# Patient Record
Sex: FEMALE | Race: AMERICAN INDIAN OR ALASKA NATIVE | HISPANIC OR LATINO | Employment: FULL TIME | URBAN - NONMETROPOLITAN AREA
[De-identification: names, ages, dates, MRNs, and addresses within clinical notes are randomized per-mention and may not be internally consistent; named-entity substitution may affect disease eponyms.]

---

## 2021-09-08 ENCOUNTER — ALLIED HEALTH/NURSE VISIT (OUTPATIENT)
Dept: FAMILY MEDICINE | Facility: OTHER | Age: 31
End: 2021-09-08
Attending: FAMILY MEDICINE
Payer: COMMERCIAL

## 2021-09-08 DIAGNOSIS — Z20.822 EXPOSURE TO 2019 NOVEL CORONAVIRUS: Primary | ICD-10-CM

## 2021-09-08 PROCEDURE — C9803 HOPD COVID-19 SPEC COLLECT: HCPCS

## 2021-09-08 PROCEDURE — U0003 INFECTIOUS AGENT DETECTION BY NUCLEIC ACID (DNA OR RNA); SEVERE ACUTE RESPIRATORY SYNDROME CORONAVIRUS 2 (SARS-COV-2) (CORONAVIRUS DISEASE [COVID-19]), AMPLIFIED PROBE TECHNIQUE, MAKING USE OF HIGH THROUGHPUT TECHNOLOGIES AS DESCRIBED BY CMS-2020-01-R: HCPCS | Mod: ZL

## 2021-09-09 LAB — SARS-COV-2 RNA RESP QL NAA+PROBE: NEGATIVE

## 2021-09-24 ENCOUNTER — ALLIED HEALTH/NURSE VISIT (OUTPATIENT)
Dept: OBGYN | Facility: OTHER | Age: 31
End: 2021-09-24
Attending: FAMILY MEDICINE
Payer: COMMERCIAL

## 2021-09-24 VITALS — HEIGHT: 63 IN | BODY MASS INDEX: 32.11 KG/M2 | WEIGHT: 181.2 LBS

## 2021-09-24 DIAGNOSIS — O36.80X0 ENCOUNTER TO DETERMINE FETAL VIABILITY OF PREGNANCY: ICD-10-CM

## 2021-09-24 DIAGNOSIS — Z32.01 PREGNANCY EXAMINATION OR TEST, POSITIVE RESULT: Primary | ICD-10-CM

## 2021-09-24 PROBLEM — M43.16 SPONDYLOLISTHESIS OF LUMBAR REGION: Status: ACTIVE | Noted: 2019-04-22

## 2021-09-24 PROBLEM — H92.02 LEFT EAR PAIN: Status: ACTIVE | Noted: 2020-10-05

## 2021-09-24 PROBLEM — E66.3 OVERWEIGHT (BMI 25.0-29.9): Status: ACTIVE | Noted: 2018-02-09

## 2021-09-24 PROBLEM — J06.9 VIRAL UPPER RESPIRATORY TRACT INFECTION: Status: ACTIVE | Noted: 2020-10-05

## 2021-09-24 PROBLEM — H16.202 KERATOCONJUNCTIVITIS OF LEFT EYE: Status: ACTIVE | Noted: 2017-12-01

## 2021-09-24 PROCEDURE — 99211 OFF/OP EST MAY X REQ PHY/QHP: CPT

## 2021-09-24 RX ORDER — PRENATAL WITH FERROUS FUM AND FOLIC ACID 3080; 920; 120; 400; 22; 1.84; 3; 20; 10; 1; 12; 200; 27; 25; 2 [IU]/1; [IU]/1; MG/1; [IU]/1; MG/1; MG/1; MG/1; MG/1; MG/1; MG/1; UG/1; MG/1; MG/1; MG/1; MG/1
1 TABLET ORAL DAILY
Qty: 90 TABLET | Refills: 3 | Status: SHIPPED | OUTPATIENT
Start: 2021-09-24

## 2021-09-24 RX ORDER — MINOXIDIL 5 %
FOAM (GRAM) TOPICAL
COMMUNITY
Start: 2021-04-27

## 2021-09-24 ASSESSMENT — PATIENT HEALTH QUESTIONNAIRE - PHQ9: SUM OF ALL RESPONSES TO PHQ QUESTIONS 1-9: 11

## 2021-09-24 ASSESSMENT — MIFFLIN-ST. JEOR: SCORE: 1506.05

## 2021-09-24 NOTE — PROGRESS NOTES
HPI:    This is a 31 year old female patient,  who presents today for OB Intake visit. Patient reports positive pregnancy test at home.     Obstetrical history and OB Questionnaire updated to the best of this nurse's ability based on patient report. PHQ-9 depression screening and routine Domestic Abuse screening completed. All immediate questions and concerns answered.    FOOD SECURITY SCREENING QUESTIONS  Hunger Vital Signs:  Within the past 12 months we worried whether our food would run out before we got money to buy more. Never  Within the past 12 months the food we bought just didn't last and we didn't have money to get more. Never    Last menstrual period is reported as Patient's last menstrual period was 2021. FRANCIA based on LMP is Estimated Date of Delivery: 2022.  Her cycles are regular.  Her last menstrual period was normal.   Since her LMP, she has experienced  nausea, abdominal pain, urinary frequency and hemorrhoids.       OBSTETRIC HISTORY:    OB History    Para Term  AB Living   5 2 2 0 2 2   SAB TAB Ectopic Multiple Live Births   2 0 0 1 2      # Outcome Date GA Lbr Jv/2nd Weight Sex Delivery Anes PTL Lv   5 Current            4A SAB 2020           4B             3 Term 14 41w4d / 00:14 3.614 kg (7 lb 15.5 oz) M Vag-Spont EPI, Other N HENRY      Name: Armando      Apgar1: 8  Apgar5: 9   2 Term 11 40w6d 18:39 3.147 kg (6 lb 15 oz) F Vag-Forceps EPI N HENRY      Name: Honorio      Apgar1: 8  Apgar5: 9   1 SAB 09 12w0d    SAB   FD       Age of first pregnancy: 20  Previous OB Provider: Flaquito  Previous Delivering Clinic: [facility]  Release of Records: In epic    Current delivery plan: GICH  Preferred OB Provider: SYLVIA  Current Primary Care Provider: Needs new one.  Pediatrician: SYLVIA    Additional History: hx of depression- gave pt mental health information and first call for help information. Hx of spinal headaches after son. Back pain- sees chiro.  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   Family and father of baby is supportive of current pregnancy.  Past Medical History of Father of Baby:No significant medical history    Past History:  Her past medical history   Past Medical History:   Diagnosis Date     Presence of intrauterine contraceptive device     No Comments Provided   .      Her past surgical history:   Past Surgical History:   Procedure Laterality Date     APPENDECTOMY       CHOLECYSTECTOMY       OTHER SURGICAL HISTORY      [id number],NO PREVIOUS SURGERY       She has a history of  none    Since her LMP she admits to the use of alcohol but none since finding out .    Pap smear history: Last 3 Pap Results: No results found for: PAP- 10/2/2018 NIL     STD/STI history: No STD history    STD/STI symptoms: no noticeable symptoms     Past medical, surgical, social and family history were reviewed and updated in EPIC.    Medications reviewed by this nurse. Current medication list:  Current Outpatient Medications   Medication Sig Dispense Refill     Minoxidil (ROGAINE MENS EXTRA STRENGTH) 5 % FOAM Apply to scalp twice daily (Patient not taking: Reported on 2021)       The following medications were recommended to be discontinued due to Pregnancy Category D status: rogaine  Patient informed to contact her primary care provider as soon as possible to discuss a safer alternative.    Risk factors:  Moderate and moderately severe risks (consult with OB/Gyn)  Previous fetal or  demise: Yes  History of  delivery: No  History of heart disease Class I: No  Severe anemia, unresponsive to iron therapy: No  Pelvic mass or neoplasm: No  Previous :  No  Hyper/hypothyroidism: No  History of postpartum hemorrhage requiring transfusion:No  History of Placenta Accreta: No    High Risk (Pregnancy managed by OB/Gyn)  Multiple pregnancy: No  Pre-gestational diabetes: No  Chronic Hypertension: No  Renal Failure: No  Heart disease, class II or greater: No  Rh Isoimmunization: No  Chronic active hepatitis: No  Convulsive disorder, poorly controlled: No  Isoimmune thrombocytopenia: No  Pre-term premature rupture of membranes: No  Lupus or other autoimmune disorder: No  Human Immunodeficiency Virus: No    HCG Qual Urine   Date Value Ref Range Status   2013 Negative Negative        ASSESSMENT/PLAN:       ICD-10-CM    1. Pregnancy examination or test, positive result  Z32.01    2. Encounter to determine fetal viability of pregnancy  O36.80X0        31 year old , Unknown of pregnancy with FRANCIA of Not found.    Urine pregnancy test was completed today and results are noted above.    Per standing orders and scope of practice of this nurse, patient will have the following orders placed and completed prior to initial OB visit with the appropriate provider:    --early ultrasound for dating and viability ordered for 6+ weeks gestation based on LMP    --Quantitative Beta HCG and progesterone monitoring if indicated    Counseling given:     - Recommended weight gain for pregnancy: < 15 lbs.   BMI < 18.5  28-40 lbs   18.5 - 24.9 25-35   25 - 29.9 15-25   > 30  < 15       PLAN/PATIENT INSTRUCTIONS:    Normal exercise.  Normal sexual activity.  Prenatal vitamins.  Anticipated weight gain.    follow-up appointment with Dr. WARD for pre- care and take multivitamin or pre- vitamins    Vangie Kumar RN.................................................. 2021 10:45 AM

## 2021-09-28 ENCOUNTER — HOSPITAL ENCOUNTER (OUTPATIENT)
Dept: ULTRASOUND IMAGING | Facility: OTHER | Age: 31
Discharge: HOME | End: 2021-09-28
Attending: FAMILY MEDICINE | Admitting: FAMILY MEDICINE
Payer: COMMERCIAL

## 2021-09-28 DIAGNOSIS — Z32.01 PREGNANCY EXAMINATION OR TEST, POSITIVE RESULT: ICD-10-CM

## 2021-09-28 DIAGNOSIS — O36.80X0 ENCOUNTER TO DETERMINE FETAL VIABILITY OF PREGNANCY: ICD-10-CM

## 2021-09-28 PROCEDURE — 76801 OB US < 14 WKS SINGLE FETUS: CPT

## 2021-09-28 PROCEDURE — 76817 TRANSVAGINAL US OBSTETRIC: CPT

## 2021-10-01 ENCOUNTER — NURSE TRIAGE (OUTPATIENT)
Dept: FAMILY MEDICINE | Facility: OTHER | Age: 31
End: 2021-10-01

## 2021-10-01 NOTE — TELEPHONE ENCOUNTER
Called and spoke to Patient after verifying last name and date of birth. Pt was informed of Dr. Devine's response and recommendation. Pt transferred to scheduling, to change her appointment from 10/14, to next week.     In regards to vaginal bleeding following intercourse, writer recommended she consider being seen sooner, if this becomes concerning or worsens in any way, either via appointment in clinic or RC. She may refrain from intercourse until she hears back from [facility]. The patient indicates understanding of these issues and agrees with the plan.    FYI to [facility].    Joan Pitt RN .............. 10/1/2021  3:50 PM

## 2021-10-01 NOTE — TELEPHONE ENCOUNTER
Encounter routed to RN triage pool from OBGYN pool, with note that this is to be addressed by FP, as Pt plans to see CCN for OB care. U/S ordered by CCN. Per clinic policy, Pt is not eligible for triage by RN, as they have not seen primary care provider at [facility] in the past 2 years. CCN out of clinic today. Routing to covering provider to review and address as appropriate. Joan Pitt RN .............. 10/1/2021  2:05 PM

## 2021-10-01 NOTE — TELEPHONE ENCOUNTER
Carol left a message on the unit 5 scheduling voicemail asking if she needs to be seen sooner due to her ultrasound results.  She is wondering if someone could answer a couple questions about the ultrasound.  Also said that she had some bleeding after intercourse last night and would like to know if that is normal.  Claudine Hernández on 10/1/2021 at 12:39 PM

## 2021-10-04 ENCOUNTER — OFFICE VISIT (OUTPATIENT)
Dept: FAMILY MEDICINE | Facility: OTHER | Age: 31
End: 2021-10-04
Attending: FAMILY MEDICINE
Payer: COMMERCIAL

## 2021-10-04 ENCOUNTER — HOSPITAL ENCOUNTER (OUTPATIENT)
Dept: ULTRASOUND IMAGING | Facility: OTHER | Age: 31
End: 2021-10-04
Attending: FAMILY MEDICINE
Payer: COMMERCIAL

## 2021-10-04 ENCOUNTER — TELEPHONE (OUTPATIENT)
Dept: FAMILY MEDICINE | Facility: OTHER | Age: 31
End: 2021-10-04

## 2021-10-04 VITALS
DIASTOLIC BLOOD PRESSURE: 70 MMHG | TEMPERATURE: 98.6 F | SYSTOLIC BLOOD PRESSURE: 124 MMHG | BODY MASS INDEX: 32.42 KG/M2 | RESPIRATION RATE: 16 BRPM | WEIGHT: 183 LBS | HEART RATE: 76 BPM

## 2021-10-04 DIAGNOSIS — O20.9 VAGINAL BLEEDING IN PREGNANCY, FIRST TRIMESTER: ICD-10-CM

## 2021-10-04 DIAGNOSIS — O03.9 SPONTANEOUS PREGNANCY LOSS: Primary | ICD-10-CM

## 2021-10-04 LAB
B-HCG SERPL-ACNC: 1344 IU/L
C TRACH DNA SPEC QL PROBE+SIG AMP: NEGATIVE
CLUE CELLS: ABNORMAL
HOLD SPECIMEN: NORMAL
N GONORRHOEA DNA SPEC QL NAA+PROBE: NEGATIVE
TRICHOMONAS, WET PREP: ABNORMAL
WBC'S/HIGH POWER FIELD, WET PREP: ABNORMAL
YEAST, WET PREP: ABNORMAL

## 2021-10-04 PROCEDURE — G0463 HOSPITAL OUTPT CLINIC VISIT: HCPCS

## 2021-10-04 PROCEDURE — G0463 HOSPITAL OUTPT CLINIC VISIT: HCPCS | Mod: 25

## 2021-10-04 PROCEDURE — 36415 COLL VENOUS BLD VENIPUNCTURE: CPT | Mod: ZL | Performed by: FAMILY MEDICINE

## 2021-10-04 PROCEDURE — 84702 CHORIONIC GONADOTROPIN TEST: CPT | Mod: ZL | Performed by: FAMILY MEDICINE

## 2021-10-04 PROCEDURE — 87210 SMEAR WET MOUNT SALINE/INK: CPT | Mod: ZL | Performed by: FAMILY MEDICINE

## 2021-10-04 PROCEDURE — 99214 OFFICE O/P EST MOD 30 MIN: CPT | Performed by: FAMILY MEDICINE

## 2021-10-04 PROCEDURE — 76817 TRANSVAGINAL US OBSTETRIC: CPT

## 2021-10-04 PROCEDURE — 87491 CHLMYD TRACH DNA AMP PROBE: CPT | Mod: ZL | Performed by: FAMILY MEDICINE

## 2021-10-04 RX ORDER — MISOPROSTOL 200 UG/1
800 TABLET ORAL ONCE
Qty: 8 TABLET | Refills: 0 | Status: SHIPPED | OUTPATIENT
Start: 2021-10-04 | End: 2021-10-04

## 2021-10-04 RX ORDER — MISOPROSTOL 200 UG/1
800 TABLET ORAL ONCE
Qty: 4 TABLET | Refills: 0 | Status: SHIPPED | OUTPATIENT
Start: 2021-10-04 | End: 2021-10-04

## 2021-10-04 RX ORDER — OXYCODONE HYDROCHLORIDE 5 MG/1
5 TABLET ORAL EVERY 6 HOURS PRN
Qty: 5 TABLET | Refills: 0 | Status: SHIPPED | OUTPATIENT
Start: 2021-10-04 | End: 2021-12-02

## 2021-10-04 RX ORDER — MIFEPRISTONE 200 MG/1
200 TABLET ORAL ONCE
Qty: 1 TABLET | Refills: 0 | Status: CANCELLED | OUTPATIENT
Start: 2021-10-04 | End: 2021-10-04

## 2021-10-04 ASSESSMENT — PAIN SCALES - GENERAL: PAINLEVEL: MILD PAIN (3)

## 2021-10-04 NOTE — PATIENT INSTRUCTIONS
Patient Education     Incomplete Miscarriage   Today's exams show your pregnancy has ended suddenly. This can be emotionally very difficult. There is little that can be done to change the way you feel. But understand that miscarriages are common.  About 1 or 2 out of every 10 pregnancies end this way. Some even end before you know you are pregnant. This happens for a number of reasons, but usually the cause is never known. It s important you know that it is not your fault. It didn t happen because you did anything wrong.  Having sex or exercising does not cause a miscarriage. These activities are usually safe unless you have pain or bleeding or your healthcare provider tells you to stop. Even minor falls won t cause a miscarriage. Miscarriages happen because things were not developing as they were supposed to. No medicine can prevent a miscarriage. After you have recovered, you should still be able to get pregnant again. But before trying, talk with your healthcare provider.  It appears that your miscarriage is not yet complete. Some tissue from the pregnancy is still in the uterus. You will probably have more cramping and bleeding for the next few days as the tissue leaves your uterus. Usually all of the tissue will pass out by itself. But sometimes tissue remains. In that case, it must be removed to stop bleeding and prevent infection.  Home care  Follow these tips to take of yourself at home:    You can go back to your normal activities if you don t have heavy bleeding or pain.    You may have some cramping and bleeding, but it shouldn t be severe.  Until the bleeding stops completely and to prevent infection:    Don t have sex until your healthcare provider says it s OK.    Use sanitary napkins instead of tampons.    Don t douche.  Having a miscarriage can be very difficult emotionally. It's natural to feel sadness or grief. It may help to talk about your feelings with family and friends, or with a  counselor.  Follow-up care  You may pass fetal tissue. If you see anything, it may appear as a 1-inch or larger piece of gray or pink flesh. If fetal tissue has not passed from your vagina within the next 5 days, you need to see your healthcare provider for another exam. To prevent infection in the uterus, your provider might need to take out the tissue by surgery. Or you may be given medicine to take at home to help your body expel the rest of the tissue.  If you had an ultrasound, a radiologist will review it. You will be told of any new findings that may affect your care.  Call 911  Call 911 if you have:    Severe pain and very heavy bleeding    Severe lightheadedness, passing out, or fainting    Rapid heart rate    Trouble breathing    Confusion or trouble waking up  When to seek medical advice  Call your healthcare provider right away if any of these occur:    Heavy bleeding. This means soaking 1 new pad an hour over 3 hours.    Foul-smelling vaginal discharge    Fever of 100.4 F (38 C) or higher, or as directed by your healthcare provider    Pain in your lower belly (abdomen) that gets worse    Weakness or dizziness  Steve last reviewed this educational content on 11/1/2019 2000-2021 [employer]. All rights reserved. This information is not intended as a substitute for professional medical care. Always follow your healthcare professional's instructions.           Patient Education     Discharge Instructions for Miscarriage   You have had a miscarriage. This is the unplanned end of a pregnancy before the baby can live outside the uterus. You may have had a shock to your system, both physically and emotionally. Because of this, you may not feel well for a few days. Your body is going through changes. And you can expect mood swings. When you are ready, start back to your normal routine.  Home care  Suggestions for care at home include:    Return to work or your daily routines when you feel  ready. This might be right away, or you may want to wait a few days.    Take showers instead of tub baths. This helps prevent infection. Ask your healthcare provider when you can take baths again.    Don't do any strenuous exercise right away, such as aerobics or running. Wait until the bleeding slows to the rate of a normal period.    Don t have sex or use tampons or douches until your provider says it s OK.    Get emotional support. Ask your provider about support groups in your area. Many women find it helpful to talk with other women who have had a miscarriage.  Follow-up  Make a follow-up appointment with your healthcare provider.  When to call your healthcare provider  Call your healthcare provider right away if you have any of the following:    Fever above 100.4 F (38 C) or higher, or as advised by your provider    Chills    Bright red vaginal bleeding or a smelly discharge    Vaginal bleeding that soaks more than 1 menstrual pad per hour    Belly pain that is severe or getting worse  Steve last reviewed this educational content on 6/1/2019 2000-2021 [employer]. All rights reserved. This information is not intended as a substitute for professional medical care. Always follow your healthcare professional's instructions.

## 2021-10-04 NOTE — PROGRESS NOTES
Nursing Notes:   Theodora Tellez LPN  10/4/2021  2:09 PM  Sign at exiting of workspace  Chief Complaint   Patient presents with     Prenatal Care     Here today with boyfriend with some bleeding and cramping. States that this started after intercourse on Thursday evening and has not stopped. Very anxious and concerned about a miscarriage.     Medication Reconciliation: complete  Theodora Tellez LPN       SUBJECTIVE:  HPI: Carol Chase is a 31 year old  female here with her boyfriend for first trimester bleeding and cramping.    Patient reports vaginal bleeding starting Thursday evening (four days ago) that was initially light.  She then had some heavier bleeding on Friday.  She has been spotting since then with dark brownish blood.  Today she notes pelvic cramping.  She denies any abdominal tenderness, fevers or chills.  She denies any history of STDs.    Patient reports stopping her OCP in July. LMP is approximately 2021. She did not have a period in August. Positive urine pregnancy test on 9/3/2021.  Her periods were previously regular.  She had a transvaginal ultrasound on 2021 for dating showing a fetal pole measuring less than 5 mm without visualized fetal cardiac motion.    OB history significant for 1 first trimester loss between 10 and 12 weeks.  History of 2 term NSVDs.  Her daughter is 10 and her son is 7.  She is with a new partner, Kin.  The couple works together at the [employer] in the [facility].    Patient did have her dating ultrasound on 2021, which revealed an intrauterine gestational sac containing a yolk sac with a fetal pole measuring less than 5 mm without visualized fetal cardiac motion.  No chorionic hemorrhage was identified.  Short interval follow-up was recommended to confirm viability and dating.    From OB intake note: Additional History: hx of depression- gave pt mental health information and first call for help information. Hx of spinal headaches  after son. Back pain- sees chiro. Bursitis  in hips, Hx of colp.    Allergies:  Allergies   Allergen Reactions     Augmentin Itching and Rash     Pt reports widespread itching within 4 hours of 1st dose. Also flat pink rash.   Pt reports widespread itching within 4 hours of 1st dose. Also flat pink rash.        ROS:  Constitutional, HEENT, cardiovascular, pulmonary, GI and  systems are negative, except as otherwise noted.    Past medical, surgical, and family history reviewed in the chart.  Relevant social history listed in HPI.    OBJECTIVE:  /70 (BP Location: Right arm, Patient Position: Sitting, Cuff Size: Adult Regular)   Pulse 76   Temp 98.6  F (37  C) (Tympanic)   Resp 16   Wt 83 kg (183 lb)   LMP 07/08/2021   Breastfeeding No   BMI 32.42 kg/m      EXAM:  Constitutional: No acute distress. Well-groomed, well-hydrated and well-nourished.  Appears stated age.  Head: Normocephalic, atraumatic.  Eyes: anicteric  Respiratory: Non-labored respirations. Clear to auscultation bilaterally.  No wheezing, rhonchi, or rales.  Cardiovascular: Regular rate.  No murmur.  No lower extremity edema.  Abdominal: Soft, nontender, non-distended. No abnormal masses or organomegaly.  Skin: Warm, dry, intact.  No concerning rashes or lesions.  Musculoskeletal: Moves arms and legs equally and normally.   Neurologic: A+Ox3. CN 2-12 grossly intact.  Psychiatric: Tearful. Appropriate affect and insight.  Pelvic exam: normal female external genitalia, normal BUSE, vaginal mucosa pink, moist, well rugated, brown blood noted in the vaginal canal and at the cervical os which appears ~1cm dilated. No fetal tissue visualized. Swabs obtained.    BSUS: Intrauterine gestational sac w/o fetal cardiac activity or movement. No adnexal masses visualized.    10/4 Transvaginal ultrasound:  CRL:  6.5 mm, 6 weeks 4 days  Adnexa:  Normal   Ultrasound Age:  6 weeks 4 days  Cardiac activity:  No  Impression: Likely failed intrauterine  pregnancy. Irregular appearing  gestational sac with a mean diameter of 6.5 mm corresponding to  gestational age of 6 weeks 4 days. No apparent fetal pole. No cardiac  activity.    9/28 Transvaginal ultrasound:  FINDINGS:   There is an intrauterine gestational sac containing a yolk sac. Fetal  pole measuring less than 5 mm without visualized fetal cardiac motion  is questioned. No subchorionic hemorrhage is identified.   No adnexal mass is present. No free fluid is seen.      Office Visit on 10/04/2021   Component Date Value Ref Range Status     Trichomonas 10/04/2021 Absent  Absent Final     Yeast 10/04/2021 Absent  Absent Final     Clue Cells 10/04/2021 Absent  Absent Final     WBCs/high power field 10/04/2021 1+* None Final     Chlamydia Trachomatis 10/04/2021 Negative  Negative Final    Negative for C. trachomatis rRNA by transcription mediated amplification.   A negative result by transcription mediated amplification does not preclude the presence of infection because results are dependent on proper and adequate collection, absence of inhibitors and sufficient rRNA to be detected.     Neisseria gonorrhoeae 10/04/2021 Negative  Negative Final    Negative for N. gonorrhoeae rRNA by transcription mediated amplification. A negative result by transcription mediated amplification does not preclude the presence of C. trachomatis infection because results are dependent on proper and adequate collection, absence of inhibitors and sufficient rRNA to be detected.     hCG Quantitative 10/04/2021 1,344  IU/L Final    Adult: 0-5 IU/L for healthy non-pregnant person  Neonates: Should be within normal ranges by 2 days after birth  Expected value for healthy non-pregnant premenopausal woman is < 5 mlU/mL    For Gestational Assessment:  Approx Gestation Age  Approx HCG Range  1-2 weeks    mlu/mL  2-3 weeks   100-5000 mlu/mL  3-4 weeks   500-33557 mlu/mL  4-5 weeks   1000-40705 mlu/mL  5-6 weeks   49809-864200 mlu/mL  6-8  weeks   94724-138163 mlu/mL  8-12 weeks   04411-581677nev/mL     Hold Specimen 10/04/2021 [facility]   Final       ASSESSMENT/PLAN:   32 yo  F @ 12w5d based on LMP, and 6w4d based on FTUS, 12w4d based on LMP, here for first trimester bleeding and cramping found to have CRL>6mm, absent fetal cardiac activity, and a progesterone level below the discriminatory zone, consistent with early pregnancy loss. Additionally no ectopic pregnancies are visualized.     1. Spontaneous pregnancy loss  Comment: Diagnoses based on CRL >6mm without cardiac activity. Discussed diagnosis and management options with patient, including observation, medication management, and surgical management. Patient wishes to proceed with medication management. Case and management discussed with on-call OB/GYN, Dr. Bhat.  Plan:  - Misoprostol (CYTOTEC) 200 MCG tablet; Take 4 tablets (800 mcg) by mouth once for 1 dose Take 4 tablets the first evening.  If in 2 days you do not passed fetal tissue, take an additional 4 tablets.  Dispense: 8 tablet; Refill: 0  -Pain control w/ IBU 800mg q8hr prn + oxycodone 5mg q6 hrs prn  -OxyCODONE (ROXICODONE) 5 MG tablet; Take 1 tablet (5 mg) by mouth every 6 hours as needed for pain  Dispense: 5 tablet; Refill: -Pt aware she may see fetal tissue passing  -RTC visit in 3 days, scheduled  -Bleeding/ER precautions discussed with patient    2. Vaginal bleeding in pregnancy, first trimester  - Wet Prep, Genital  - GC/Chlamydia by PCR  - HCG quantitative pregnancy  - Hemoglobin; Future  - TVUS  - HGB ordered, but not collected, per lab    Ary Trujillo MD  [facility]

## 2021-10-04 NOTE — TELEPHONE ENCOUNTER
This was triage on Friday.     Called and spoke to Patient after verifying last name and date of birth. Pt was informed of Dr. Devine's response and recommendation. Pt transferred to scheduling, to change her appointment from 10/14, to next week.      In regards to vaginal bleeding following intercourse, writer recommended she consider being seen sooner, if this becomes concerning or worsens in any way, either via appointment in clinic or RC. She may refrain from intercourse until she hears back from [facility]. The patient indicates understanding of these issues and agrees with the plan.     FYI to [facility].     Joan Pitt RN .............. 10/1/2021  3:50 PM    Called patient and received update:  Spotting bleeding continues. Red and brown with intermittent cramping (not new). Adriane Singletary RN  ....................  10/4/2021   10:19 AM

## 2021-10-04 NOTE — NURSING NOTE
Chief Complaint   Patient presents with     Prenatal Care     Here today with boyfriend with some bleeding and cramping. States that this started after intercourse on Thursday evening and has not stopped. Very anxious and concerned about a miscarriage.     Medication Reconciliation: complete    Theodora Tellez, LPN

## 2021-10-04 NOTE — TELEPHONE ENCOUNTER
Spoke with Dr. Trujillo on patient's symptoms. She ok 'd to have patient be placed for short appointment today at 2 pm. Patient placed in slot and updated. Patient reported she will be here for appointment. Told patient to keep her October 14th appointment. Adriane Singletary RN  ....................  10/4/2021   10:32 AM

## 2021-10-04 NOTE — TELEPHONE ENCOUNTER
Carol called and would like to speak with a nurse as she is still spotting and is pregnant.  Claudine Hernández on 10/4/2021 at 9:52 AM

## 2021-10-07 ENCOUNTER — OFFICE VISIT (OUTPATIENT)
Dept: FAMILY MEDICINE | Facility: OTHER | Age: 31
End: 2021-10-07
Attending: FAMILY MEDICINE
Payer: COMMERCIAL

## 2021-10-07 VITALS
BODY MASS INDEX: 32.06 KG/M2 | DIASTOLIC BLOOD PRESSURE: 80 MMHG | SYSTOLIC BLOOD PRESSURE: 116 MMHG | RESPIRATION RATE: 16 BRPM | WEIGHT: 181 LBS | HEART RATE: 72 BPM | TEMPERATURE: 98.7 F

## 2021-10-07 DIAGNOSIS — O03.9 SPONTANEOUS PREGNANCY LOSS: Primary | ICD-10-CM

## 2021-10-07 DIAGNOSIS — N96 RECURRENT PREGNANCY LOSS: ICD-10-CM

## 2021-10-07 DIAGNOSIS — Z30.09 BIRTH CONTROL COUNSELING: ICD-10-CM

## 2021-10-07 LAB
B-HCG SERPL-ACNC: 124 IU/L
HGB BLD-MCNC: 13.5 G/DL (ref 11.7–15.7)

## 2021-10-07 PROCEDURE — 85018 HEMOGLOBIN: CPT | Mod: ZL | Performed by: FAMILY MEDICINE

## 2021-10-07 PROCEDURE — G0463 HOSPITAL OUTPT CLINIC VISIT: HCPCS

## 2021-10-07 PROCEDURE — 36415 COLL VENOUS BLD VENIPUNCTURE: CPT | Mod: ZL | Performed by: FAMILY MEDICINE

## 2021-10-07 PROCEDURE — 99213 OFFICE O/P EST LOW 20 MIN: CPT | Performed by: FAMILY MEDICINE

## 2021-10-07 PROCEDURE — 84702 CHORIONIC GONADOTROPIN TEST: CPT | Mod: ZL | Performed by: FAMILY MEDICINE

## 2021-10-07 RX ORDER — MISOPROSTOL 200 UG/1
TABLET ORAL
COMMUNITY
Start: 2021-10-04 | End: 2021-12-02

## 2021-10-07 RX ORDER — NORGESTIMATE AND ETHINYL ESTRADIOL 7DAYSX3 LO
1 KIT ORAL DAILY
Qty: 30 TABLET | Refills: 3 | Status: SHIPPED | OUTPATIENT
Start: 2021-10-07 | End: 2021-10-26

## 2021-10-07 ASSESSMENT — PAIN SCALES - GENERAL: PAINLEVEL: MILD PAIN (2)

## 2021-10-07 NOTE — PROGRESS NOTES
"Nursing Notes:   Theodora Tellez LPN  10/7/2021  2:09 PM  Sign at exiting of workspace  Chief Complaint   Patient presents with     RECHECK     Doing okay under the circumstances. Has some questions.     Medication Reconciliation: complete    Theodora Tellez LPN      SUBJECTIVE:  HPI: Carol Chase is a 31 year old  F previously 6w4d based on FTUS, 12w4d based on LMP, found to have an early pregnancy loss on 10/4, based on CRL>6mm, absent fetal cardiac activity, and a progesterone level below the discriminatory zone. She is here with her boyfriend to follow-up.    Patient was seen in clinic on 10/4 (see prior note for details) with first her menstrual bleeding and cramping, found to have an incomplete miscarriage.  Patient was compliant with 800 mcg of misoprostol that evening.  She reports having vaginal bleeding with clots and pelvic cramping throughout the night which lightened the subsequent day and evening.  She currently reports light vaginal bleeding that resembles a menstrual cycle, and changing 1 pad per day.  Patient is unsure if she saw fetal tissue or not.  She denies any lightheadedness or dizziness.  Patient did not require oxycodone for pain. Patient denies any sharp abdominal pain, fevers or chills.  Mood is reported as \"up and down\" and \"sad\" but patient thinks overall she is coping well. Denies SI.    Patient and partner do desire a future pregnancy.    Allergies:  Allergies   Allergen Reactions     Augmentin Itching and Rash     Pt reports widespread itching within 4 hours of 1st dose. Also flat pink rash.   Pt reports widespread itching within 4 hours of 1st dose. Also flat pink rash.        ROS:  Constitutional, HEENT, cardiovascular, pulmonary, GI and  systems are negative, except as otherwise noted.    Past medical, surgical, and family history reviewed in the chart.  Relevant social history listed in HPI.    OBJECTIVE:  /80 (BP Location: Right arm, Patient Position: " Sitting, Cuff Size: Adult Regular)   Pulse 72   Temp 98.7  F (37.1  C) (Tympanic)   Resp 16   Wt 82.1 kg (181 lb)   LMP 07/08/2021 (Approximate)   Breastfeeding No   BMI 32.06 kg/m      EXAM:  Constitutional: No acute distress. Well-groomed, well-hydrated and well-nourished.  Appears stated age.  Head: Normocephalic, atraumatic.  Eyes: anicteric  Respiratory: Non-labored respirations. Clear to auscultation bilaterally.  No wheezing, rhonchi, or rales.  Cardiovascular: Regular rate.  No murmur.  No lower extremity edema.  Abdominal: Soft, nontender, non-distended. No guarding.  Skin: Warm, dry, intact.   Musculoskeletal: Moves arms and legs equally and normally.   Neurologic: A+Ox3.   Psychiatric: Appropriate affect and insight.    Previous imaging:  10/4 Transvaginal ultrasound:  CRL:  6.5 mm, 6 weeks 4 days  Adnexa:  Normal   Ultrasound Age:  6 weeks 4 days  Cardiac activity:  No  Impression: Likely failed intrauterine pregnancy. Irregular appearing  gestational sac with a mean diameter of 6.5 mm corresponding to  gestational age of 6 weeks 4 days. No apparent fetal pole. No cardiac  activity.    9/28 Transvaginal ultrasound:  FINDINGS:   There is an intrauterine gestational sac containing a yolk sac. Fetal  pole measuring less than 5 mm without visualized fetal cardiac motion  is questioned. No subchorionic hemorrhage is identified.   No adnexal mass is present. No free fluid is seen.      Office Visit on 10/07/2021   Component Date Value Ref Range Status     Hemoglobin 10/07/2021 13.5  11.7 - 15.7 g/dL Final     hCG Quantitative 10/07/2021 124  IU/L Final    Adult: 0-5 IU/L for healthy non-pregnant person  Neonates: Should be within normal ranges by 2 days after birth  Expected value for healthy non-pregnant premenopausal woman is < 5 mlU/mL    For Gestational Assessment:  Approx Gestation Age  Approx HCG Range  1-2 weeks    mlu/mL  2-3 weeks   100-5000 mlu/mL  3-4 weeks   500-93157 mlu/mL  4-5  weeks   1000-32063 mlu/mL  5-6 weeks   78872-543124 mlu/mL  6-8 weeks   58837-001323 mlu/mL  8-12 weeks   12688-154416kax/mL     ASSESSMENT/PLAN:   30 yo  F following up after incomplete miscarriage, status post medication management.  Patient tolerated 1 dose of misoprostol well, experiencing heavy bleeding followed by light bleeding.  Symptoms and beta-hCG suggestive of complete expulsion.     1. Spontaneous pregnancy loss  Comment: Hemoglobin is normal and beta hCG has decreased significantly.    2. Recurrent pregnancy loss  Comment: History of 3 first trimester losses.  This was the couples first pregnancy together.  The couple desire a future pregnancy.  We discussed that after 2 first trimester losses, a lab work-up would be appropriate.  We also discussed abstinence and no tampon use for at least 2 weeks to avoid potential uterine infection.  She may experience ongoing light vaginal bleeding for the next few weeks. RTC/ER bleeding precautions discussed.  Plan:  - Ob/Gyn Referral; for recurrent pregnancy loss APAS work-up  - Encouraged pt to ask OB regarding optimal interval after miscarriage to attempt conception   - Pt requesting Rx for OCP if needing to wait 3 months  - Encouraged PNV while TTC  - She did receive pregnancy loss baby book at the last visit and resources in the community for support groups after a fetal loss     Ary Trujillo MD  [facility] AND [facility]

## 2021-10-07 NOTE — NURSING NOTE
Chief Complaint   Patient presents with     RECHECK     Doing okay under the circumstances. Has some questions.     Medication Reconciliation: complete    Theodora Tellez LPN

## 2021-10-09 ENCOUNTER — HEALTH MAINTENANCE LETTER (OUTPATIENT)
Age: 31
End: 2021-10-09

## 2021-10-21 DIAGNOSIS — Z30.09 BIRTH CONTROL COUNSELING: ICD-10-CM

## 2021-10-25 NOTE — TELEPHONE ENCOUNTER
[facility] sent Rx request for the following:      ORTHO TRI-CYCLEN LO 0.18/0.215/0.25 MG-25 MCG tablet  Sig: TAKE 1 TABLET BY MOUTH EVERY DAY      Last Prescription Date:   10/7/2021  Last Fill Qty/Refills:         30, R-3    Last Office Visit:              10/7/2021   Future Office visit:           none    Routing refill request to provider for review/approval because:  Patient requesting a 90 day supply.     Unable to complete prescription refill per RN Medication Refill Policy.................... Darrell Eugene RN ....................  10/25/2021   1:38 PM

## 2021-10-26 RX ORDER — NORGESTIMATE-ETHINYL ESTRADIOL 7DAYSX3 LO
TABLET ORAL
Qty: 84 TABLET | Refills: 2 | Status: SHIPPED | OUTPATIENT
Start: 2021-10-26 | End: 2021-12-02

## 2021-10-27 ENCOUNTER — OFFICE VISIT (OUTPATIENT)
Dept: OBGYN | Facility: OTHER | Age: 31
End: 2021-10-27
Attending: FAMILY MEDICINE
Payer: COMMERCIAL

## 2021-10-27 VITALS
SYSTOLIC BLOOD PRESSURE: 110 MMHG | HEART RATE: 76 BPM | WEIGHT: 182.9 LBS | BODY MASS INDEX: 32.4 KG/M2 | DIASTOLIC BLOOD PRESSURE: 64 MMHG

## 2021-10-27 DIAGNOSIS — Z12.4 CERVICAL CANCER SCREENING: ICD-10-CM

## 2021-10-27 DIAGNOSIS — N96 RECURRENT PREGNANCY LOSS: Primary | ICD-10-CM

## 2021-10-27 LAB — TSH SERPL DL<=0.005 MIU/L-ACNC: 1.61 MU/L (ref 0.4–4)

## 2021-10-27 PROCEDURE — G0123 SCREEN CERV/VAG THIN LAYER: HCPCS | Performed by: STUDENT IN AN ORGANIZED HEALTH CARE EDUCATION/TRAINING PROGRAM

## 2021-10-27 PROCEDURE — 86146 BETA-2 GLYCOPROTEIN ANTIBODY: CPT | Mod: ZL | Performed by: STUDENT IN AN ORGANIZED HEALTH CARE EDUCATION/TRAINING PROGRAM

## 2021-10-27 PROCEDURE — 87624 HPV HI-RISK TYP POOLED RSLT: CPT | Mod: ZL | Performed by: STUDENT IN AN ORGANIZED HEALTH CARE EDUCATION/TRAINING PROGRAM

## 2021-10-27 PROCEDURE — 36415 COLL VENOUS BLD VENIPUNCTURE: CPT | Mod: ZL | Performed by: STUDENT IN AN ORGANIZED HEALTH CARE EDUCATION/TRAINING PROGRAM

## 2021-10-27 PROCEDURE — 84443 ASSAY THYROID STIM HORMONE: CPT | Mod: ZL | Performed by: STUDENT IN AN ORGANIZED HEALTH CARE EDUCATION/TRAINING PROGRAM

## 2021-10-27 PROCEDURE — 99204 OFFICE O/P NEW MOD 45 MIN: CPT | Performed by: STUDENT IN AN ORGANIZED HEALTH CARE EDUCATION/TRAINING PROGRAM

## 2021-10-27 PROCEDURE — G0463 HOSPITAL OUTPT CLINIC VISIT: HCPCS | Performed by: STUDENT IN AN ORGANIZED HEALTH CARE EDUCATION/TRAINING PROGRAM

## 2021-10-27 PROCEDURE — 85613 RUSSELL VIPER VENOM DILUTED: CPT | Mod: ZL | Performed by: STUDENT IN AN ORGANIZED HEALTH CARE EDUCATION/TRAINING PROGRAM

## 2021-10-27 RX ORDER — ACETAMINOPHEN AND CODEINE PHOSPHATE 120; 12 MG/5ML; MG/5ML
0.35 SOLUTION ORAL DAILY
Qty: 84 TABLET | Refills: 4 | Status: SHIPPED | OUTPATIENT
Start: 2021-10-27 | End: 2023-06-28
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
## 2021-10-27 NOTE — NURSING NOTE
Pt presents to clinic today for consult on pregnancy loss.      Medication Reconciliation: complete  Aster Gray LPN

## 2021-10-29 LAB
B2 GLYCOPROT1 IGG SERPL IA-ACNC: 1 U/ML
B2 GLYCOPROT1 IGM SERPL IA-ACNC: <2.4 U/ML
DRVVT SCREEN RATIO: 1.11
INR PPP: 1.06 (ref 0.85–1.15)
LA PPP-IMP: NEGATIVE
LUPUS INTERPRETATION: NORMAL
PTT RATIO: 0.92
THROMBIN TIME: 16.8 SECONDS (ref 13–19)

## 2021-11-01 LAB
BKR LAB AP GYN ADEQUACY: NORMAL
BKR LAB AP GYN INTERPRETATION: NORMAL
BKR LAB AP HPV REFLEX: NORMAL
BKR LAB AP PREVIOUS ABNORMAL: NORMAL
PATH REPORT.RELEVANT HX SPEC: NORMAL

## 2021-11-05 LAB
HUMAN PAPILLOMA VIRUS 16 DNA: NEGATIVE
HUMAN PAPILLOMA VIRUS 18 DNA: NEGATIVE
HUMAN PAPILLOMA VIRUS FINAL DIAGNOSIS: NORMAL
HUMAN PAPILLOMA VIRUS OTHER HR: NEGATIVE

## 2021-11-08 ENCOUNTER — HOSPITAL ENCOUNTER (OUTPATIENT)
Dept: ULTRASOUND IMAGING | Facility: OTHER | Age: 31
End: 2021-11-08
Attending: STUDENT IN AN ORGANIZED HEALTH CARE EDUCATION/TRAINING PROGRAM
Payer: COMMERCIAL

## 2021-11-08 ENCOUNTER — LAB (OUTPATIENT)
Dept: LAB | Facility: OTHER | Age: 31
End: 2021-11-08
Attending: STUDENT IN AN ORGANIZED HEALTH CARE EDUCATION/TRAINING PROGRAM
Payer: COMMERCIAL

## 2021-11-08 DIAGNOSIS — N96 RECURRENT PREGNANCY LOSS: ICD-10-CM

## 2021-11-08 PROCEDURE — 36415 COLL VENOUS BLD VENIPUNCTURE: CPT | Mod: ZL

## 2021-11-08 PROCEDURE — 76830 TRANSVAGINAL US NON-OB: CPT

## 2021-11-08 PROCEDURE — 86147 CARDIOLIPIN ANTIBODY EA IG: CPT | Mod: ZL

## 2021-11-10 LAB
CARDIOLIPIN IGG SER IA-ACNC: <2 GPL-U/ML
CARDIOLIPIN IGG SER IA-ACNC: NEGATIVE
CARDIOLIPIN IGM SER IA-ACNC: 5.2 MPL-U/ML
CARDIOLIPIN IGM SER IA-ACNC: NEGATIVE

## 2021-12-02 ENCOUNTER — OFFICE VISIT (OUTPATIENT)
Dept: OBGYN | Facility: OTHER | Age: 31
End: 2021-12-02
Attending: STUDENT IN AN ORGANIZED HEALTH CARE EDUCATION/TRAINING PROGRAM
Payer: COMMERCIAL

## 2021-12-02 VITALS
HEART RATE: 82 BPM | SYSTOLIC BLOOD PRESSURE: 112 MMHG | BODY MASS INDEX: 32.66 KG/M2 | DIASTOLIC BLOOD PRESSURE: 70 MMHG | WEIGHT: 184.4 LBS

## 2021-12-02 DIAGNOSIS — Z01.818 PRE-OP EXAM: Primary | ICD-10-CM

## 2021-12-02 DIAGNOSIS — N96 RECURRENT PREGNANCY LOSS: ICD-10-CM

## 2021-12-02 LAB — HCG UR QL: NEGATIVE

## 2021-12-02 PROCEDURE — 81025 URINE PREGNANCY TEST: CPT | Mod: ZL | Performed by: STUDENT IN AN ORGANIZED HEALTH CARE EDUCATION/TRAINING PROGRAM

## 2021-12-02 PROCEDURE — 250N000013 HC RX MED GY IP 250 OP 250 PS 637: Performed by: STUDENT IN AN ORGANIZED HEALTH CARE EDUCATION/TRAINING PROGRAM

## 2021-12-02 PROCEDURE — 99214 OFFICE O/P EST MOD 30 MIN: CPT | Mod: 25 | Performed by: STUDENT IN AN ORGANIZED HEALTH CARE EDUCATION/TRAINING PROGRAM

## 2021-12-02 PROCEDURE — 76830 TRANSVAGINAL US NON-OB: CPT | Performed by: STUDENT IN AN ORGANIZED HEALTH CARE EDUCATION/TRAINING PROGRAM

## 2021-12-02 PROCEDURE — G0463 HOSPITAL OUTPT CLINIC VISIT: HCPCS | Mod: 25

## 2021-12-02 PROCEDURE — 76831 ECHO EXAM UTERUS: CPT | Mod: 26 | Performed by: OBSTETRICS & GYNECOLOGY

## 2021-12-02 PROCEDURE — 76856 US EXAM PELVIC COMPLETE: CPT | Performed by: STUDENT IN AN ORGANIZED HEALTH CARE EDUCATION/TRAINING PROGRAM

## 2021-12-02 PROCEDURE — G0463 HOSPITAL OUTPT CLINIC VISIT: HCPCS | Mod: 25 | Performed by: STUDENT IN AN ORGANIZED HEALTH CARE EDUCATION/TRAINING PROGRAM

## 2021-12-02 PROCEDURE — 58340 CATHETER FOR HYSTEROGRAPHY: CPT | Performed by: OBSTETRICS & GYNECOLOGY

## 2021-12-02 RX ORDER — IBUPROFEN 200 MG
800 TABLET ORAL ONCE
Status: COMPLETED | OUTPATIENT
Start: 2021-12-02 | End: 2021-12-02

## 2021-12-02 RX ADMIN — IBUPROFEN 800 MG: 400 TABLET ORAL at 16:07
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
## 2021-12-02 NOTE — NURSING NOTE
"Chief Complaint   Patient presents with     Procedure     SIS       Initial /70   Pulse 82   Wt 83.6 kg (184 lb 6.4 oz)   BMI 32.66 kg/m   Estimated body mass index is 32.66 kg/m  as calculated from the following:    Height as of 9/24/21: 1.6 m (5' 3\").    Weight as of this encounter: 83.6 kg (184 lb 6.4 oz).  Medication Reconciliation: complete    FOOD SECURITY SCREENING QUESTIONS  Hunger Vital Signs:  Within the past 12 months we worried whether our food would run out before we got money to buy more. Never  Within the past 12 months the food we bought just didn't last and we didn't have money to get more. Never  Lacey Lino LPN 12/2/2021 3:22 PM              Lacey Lino LPN  "

## 2021-12-03 ASSESSMENT — PATIENT HEALTH QUESTIONNAIRE - PHQ9: SUM OF ALL RESPONSES TO PHQ QUESTIONS 1-9: 16

## 2022-09-17 ENCOUNTER — HEALTH MAINTENANCE LETTER (OUTPATIENT)
Age: 32
End: 2022-09-17

## 2023-01-28 ENCOUNTER — HEALTH MAINTENANCE LETTER (OUTPATIENT)
Age: 33
End: 2023-01-28

## 2023-06-24 DIAGNOSIS — N96 RECURRENT PREGNANCY LOSS: ICD-10-CM

## 2023-06-27 NOTE — TELEPHONE ENCOUNTER
LOV 12/2021. Call placed to patient to discuss. She did not have good cell reception, but agreed to call back.    Candice Coello RN...................6/27/2023 9:53 AM

## 2023-06-28 RX ORDER — ACETAMINOPHEN AND CODEINE PHOSPHATE 120; 12 MG/5ML; MG/5ML
SOLUTION ORAL
Qty: 84 TABLET | Refills: 0 | Status: SHIPPED | OUTPATIENT
Start: 2023-06-28 | End: 2024-01-06

## 2023-08-09 LAB
ERYTHROCYTE [DISTWIDTH] IN BLOOD BY AUTOMATED COUNT: 12.3 % (ref 11.5–14.5)
HBSAG SCREEN: NORMAL
HEMATOCRIT (EXTERNAL): 40.4 % (ref 38–47)
HEMOGLOBIN (EXTERNAL): 13.9 G/DL (ref 12–16)
HIV 1&2 EXT: NORMAL
Lab: 22 TEXT (ref 30–100)
MCH RBC QN AUTO: 30.7 PG (ref 27–31)
MCHC RBC AUTO-ENTMCNC: 34.4 G/DL (ref 32–36)
MCV RBC AUTO: 89.2 FL (ref 80–96)
PLATELET COUNT (EXTERNAL): 340 10E3/UL (ref 150–400)
PMV BLD: 9.4 FL (ref 7.4–10.4)
RBC # BLD AUTO: 4.53 10E6/UL (ref 4.2–5.4)
WBC COUNT (EXTERNAL): 8.3 10E3/UL (ref 4–11)

## 2023-08-11 LAB
ABO (EXTERNAL): NORMAL
HEPATITIS C ANTIBODY (EXTERNAL): NONREACTIVE
HIV1+2 AB SERPL QL IA: NONREACTIVE
RH (EXTERNAL): POSITIVE
RUBELLA ANTIBODY IGG (EXTERNAL): NORMAL

## 2023-09-12 ENCOUNTER — TRANSFERRED RECORDS (OUTPATIENT)
Dept: HEALTH INFORMATION MANAGEMENT | Facility: OTHER | Age: 33
End: 2023-09-12

## 2023-09-29 LAB
C TRACH DNA SPEC QL PROBE+SIG AMP: NEGATIVE
Lab: NEGATIVE
N GONORRHOEA DNA SPEC QL PROBE+SIG AMP: NEGATIVE
SPECIMEN DESCRIP: NORMAL

## 2023-11-14 ENCOUNTER — TRANSFERRED RECORDS (OUTPATIENT)
Dept: HEALTH INFORMATION MANAGEMENT | Facility: OTHER | Age: 33
End: 2023-11-14

## 2023-12-27 LAB
FERRITIN (EXTERNAL): 16.55 NG/ML (ref 13–150)
GLUCOSE P FAST SERPL-MCNC: NORMAL MG/DL
GTT-1HR-SERUM: 115 MG/DL
GTT-2HR-SERUM: NORMAL
GTT-3HR-SERUM: NORMAL
HEMATOCRIT (EXTERNAL): 36.3 % (ref 38–47)
HEMOGLOBIN (EXTERNAL): 12.4 G/DL (ref 12–16)
Lab: NORMAL
PAP-ABSTRACT: NORMAL
PLATELET COUNT (EXTERNAL): 266 10E3/UL (ref 150–400)

## 2023-12-29 LAB — TREPONEMA PALLIDUM ANTIBODY (EXTERNAL): NEGATIVE

## 2024-01-06 ENCOUNTER — HOSPITAL ENCOUNTER (EMERGENCY)
Facility: OTHER | Age: 34
Discharge: HOME | End: 2024-01-06
Attending: STUDENT IN AN ORGANIZED HEALTH CARE EDUCATION/TRAINING PROGRAM | Admitting: STUDENT IN AN ORGANIZED HEALTH CARE EDUCATION/TRAINING PROGRAM
Payer: COMMERCIAL

## 2024-01-06 VITALS
WEIGHT: 184 LBS | TEMPERATURE: 98 F | SYSTOLIC BLOOD PRESSURE: 110 MMHG | DIASTOLIC BLOOD PRESSURE: 68 MMHG | RESPIRATION RATE: 16 BRPM | HEART RATE: 85 BPM | BODY MASS INDEX: 32.6 KG/M2 | OXYGEN SATURATION: 98 % | HEIGHT: 63 IN

## 2024-01-06 DIAGNOSIS — R45.851 PASSIVE SUICIDAL IDEATIONS: ICD-10-CM

## 2024-01-06 PROBLEM — F33.0 MAJOR DEPRESSIVE DISORDER, RECURRENT EPISODE, MILD (H): Status: ACTIVE | Noted: 2024-01-06

## 2024-01-06 LAB
ALBUMIN UR-MCNC: NEGATIVE MG/DL
APPEARANCE UR: CLEAR
BACTERIA #/AREA URNS HPF: ABNORMAL /HPF
BILIRUB UR QL STRIP: NEGATIVE
COLOR UR AUTO: ABNORMAL
GLUCOSE UR STRIP-MCNC: NEGATIVE MG/DL
HGB UR QL STRIP: NEGATIVE
KETONES UR STRIP-MCNC: NEGATIVE MG/DL
LEUKOCYTE ESTERASE UR QL STRIP: NEGATIVE
MUCOUS THREADS #/AREA URNS LPF: PRESENT /LPF
NITRATE UR QL: NEGATIVE
PH UR STRIP: 7 [PH] (ref 5–9)
RBC URINE: <1 /HPF
SP GR UR STRIP: 1.01 (ref 1–1.03)
SQUAMOUS EPITHELIAL: 1 /HPF
UROBILINOGEN UR STRIP-MCNC: NORMAL MG/DL
WBC URINE: 2 /HPF

## 2024-01-06 PROCEDURE — 81001 URINALYSIS AUTO W/SCOPE: CPT | Performed by: STUDENT IN AN ORGANIZED HEALTH CARE EDUCATION/TRAINING PROGRAM

## 2024-01-06 PROCEDURE — 99284 EMERGENCY DEPT VISIT MOD MDM: CPT | Performed by: STUDENT IN AN ORGANIZED HEALTH CARE EDUCATION/TRAINING PROGRAM

## 2024-01-06 PROCEDURE — 99285 EMERGENCY DEPT VISIT HI MDM: CPT | Performed by: STUDENT IN AN ORGANIZED HEALTH CARE EDUCATION/TRAINING PROGRAM

## 2024-01-06 ASSESSMENT — ACTIVITIES OF DAILY LIVING (ADL): ADLS_ACUITY_SCORE: 35
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
## 2024-01-06 NOTE — DISCHARGE INSTRUCTIONS
[facility] SCHEDULING:  Today you were seen by a licensed mental health professional through [facility]  for a crisis assessment in the Emergency Department at [facility].  It is recommended that you follow up with your estabished providers (psychiatrist, mental health therapist, and/or primary care doctor - as relevant) as soon as possible. Coordinators from [facility] will be calling you in the next 24-48 hours to ensure that you have the resources you need.  You can also contact [facility] coordinators directly at [phone].    You have been scheduled the following appointments:     Date: Tuesday, 1/9/2024  Time: 11:00 am - 12:00 pm  Provider: mAi Echevarria  PhD  [facility]  Location: [place] [address]  Phone: [phone]  Type: [facility] maintains an extensive network of licensed behavioral health providers to connect patients with the services they need.  We do not charge providers a fee to participate in our referral network.  We match patients with providers based on a patient s specific needs, insurance coverage, and location.  Our first effort will be to refer you to a provider within your care system, and will utilize providers outside your care system as needed.

## 2024-01-10 ENCOUNTER — MEDICAL CORRESPONDENCE (OUTPATIENT)
Dept: HEALTH INFORMATION MANAGEMENT | Facility: OTHER | Age: 34
End: 2024-01-10
Payer: COMMERCIAL

## 2024-01-10 ENCOUNTER — TRANSFERRED RECORDS (OUTPATIENT)
Dept: HEALTH INFORMATION MANAGEMENT | Facility: OTHER | Age: 34
End: 2024-01-10
Payer: COMMERCIAL

## 2024-01-17 ENCOUNTER — PRE VISIT (OUTPATIENT)
Dept: OBGYN | Facility: OTHER | Age: 34
End: 2024-01-17
Payer: COMMERCIAL

## 2024-01-25 ENCOUNTER — PRENATAL OFFICE VISIT (OUTPATIENT)
Dept: OBGYN | Facility: OTHER | Age: 34
End: 2024-01-25
Attending: STUDENT IN AN ORGANIZED HEALTH CARE EDUCATION/TRAINING PROGRAM
Payer: COMMERCIAL

## 2024-01-25 VITALS
SYSTOLIC BLOOD PRESSURE: 110 MMHG | BODY MASS INDEX: 35.61 KG/M2 | WEIGHT: 201 LBS | DIASTOLIC BLOOD PRESSURE: 60 MMHG | HEART RATE: 72 BPM

## 2024-01-25 DIAGNOSIS — Z23 NEEDS FLU SHOT: ICD-10-CM

## 2024-01-25 DIAGNOSIS — Z34.93 ENCOUNTER FOR PREGNANCY RELATED EXAMINATION, THIRD TRIMESTER: Primary | ICD-10-CM

## 2024-01-25 PROCEDURE — 99207 PR OB VISIT-NO CHARGE - GICH ONLY: CPT

## 2024-01-25 PROCEDURE — 90715 TDAP VACCINE 7 YRS/> IM: CPT

## 2024-01-25 ASSESSMENT — PATIENT HEALTH QUESTIONNAIRE - PHQ9
10. IF YOU CHECKED OFF ANY PROBLEMS, HOW DIFFICULT HAVE THESE PROBLEMS MADE IT FOR YOU TO DO YOUR WORK, TAKE CARE OF THINGS AT HOME, OR GET ALONG WITH OTHER PEOPLE: SOMEWHAT DIFFICULT
SUM OF ALL RESPONSES TO PHQ QUESTIONS 1-9: 13
SUM OF ALL RESPONSES TO PHQ QUESTIONS 1-9: 13

## 2024-01-25 NOTE — NURSING NOTE
Chief Complaint   Patient presents with    Prenatal Care     31w0d       Medication Reconciliation: complete    Pt presents to clinic today for prenatal care 31w0d. Pt denies any bleeding, or leakage of fluid at this time. States baby is moving good. Patient denies contractions.       Meme España LPN
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
## 2024-02-07 ENCOUNTER — PRENATAL OFFICE VISIT (OUTPATIENT)
Dept: OBGYN | Facility: OTHER | Age: 34
End: 2024-02-07
Attending: STUDENT IN AN ORGANIZED HEALTH CARE EDUCATION/TRAINING PROGRAM
Payer: COMMERCIAL

## 2024-02-07 VITALS
SYSTOLIC BLOOD PRESSURE: 110 MMHG | HEART RATE: 84 BPM | BODY MASS INDEX: 36.12 KG/M2 | DIASTOLIC BLOOD PRESSURE: 62 MMHG | WEIGHT: 203.9 LBS

## 2024-02-07 DIAGNOSIS — N96 RECURRENT PREGNANCY LOSS: Primary | ICD-10-CM

## 2024-02-07 DIAGNOSIS — Z34.93 THIRD TRIMESTER PREGNANCY: ICD-10-CM

## 2024-02-07 PROCEDURE — 99207 PR OB VISIT-NO CHARGE - GICH ONLY: CPT | Performed by: STUDENT IN AN ORGANIZED HEALTH CARE EDUCATION/TRAINING PROGRAM

## 2024-02-07 ASSESSMENT — PATIENT HEALTH QUESTIONNAIRE - PHQ9
SUM OF ALL RESPONSES TO PHQ QUESTIONS 1-9: 11
10. IF YOU CHECKED OFF ANY PROBLEMS, HOW DIFFICULT HAVE THESE PROBLEMS MADE IT FOR YOU TO DO YOUR WORK, TAKE CARE OF THINGS AT HOME, OR GET ALONG WITH OTHER PEOPLE: SOMEWHAT DIFFICULT
SUM OF ALL RESPONSES TO PHQ QUESTIONS 1-9: 11

## 2024-02-07 NOTE — PROGRESS NOTES
Return OB Visit    S: Ms. Chase is feeling well today. She has right back pain which has been chronic and she was planning to go to surgery but then found out she was pregnant. Seeing a chiropractor for pain. Denies leaking of fluid, vaginal bleeding, painful contractions. Notes fetal movements.    O:   /62   Pulse 84   Wt 92.5 kg (203 lb 14.4 oz)   LMP 2023 (Exact Date)   BMI 36.12 kg/m      Gen: Well-appearing, NAD    FH 33.5 cm   bpm    Assessment:  Ms. Carol Chase is a 33 year old yo  here for an OB follow up visit. This pregnancy is complicated by limited prenatal care, history of recurrent miscarriages, depression and prior SI.    Plan:  # Routine Prenatal Care  -- Dating: LMP=11 week US FRANCIA: 3/28/2024  -- PNLs:    B+, Ab screen neg   RPR nr   Hep B S Ag   Hep C neg   Rubella NON immune   HIV neg   Pap: NIL 2024, HPV negative    -- Genetic Screening: declined  -- Anatomy US: 74%ile and normal anatomy per IHS scan  -- Immunizations: Tdap , declines flu, RSV next visit  -- 3rd TM labs including CBC, RPR: Hgb 12.4, Plt 266, RPR  -- 1 hr GTT: 115  -- GBS: Planned for 36 weeks  -- Postpartum Planning: To be discussed   -- Delivery Planning: breastfeeding, still deciding on contraception  -- Return to clinic in  weeks for OB follow up visit  -- Planning to do at next visit: RSV next visit    # History of suicidal ideation  -- Notes she has no concerns today with this.   -- Working with a therapist  -- Plans to start zoloft after delivery    # Ob history  -- pelvis proven to 3600 grams  -- Recurrent pregancy loss: APAS negative, no indication for blood thinners this pregnancy        Answers submitted by the patient for this visit:  Patient Health Questionnaire (Submitted on 2024)  If you checked off any problems, how difficult have these problems made it for you to do your work, take care of things at home, or get along with other people?: Somewhat difficult  PHQ9  TOTAL SCORE: 11

## 2024-02-07 NOTE — NURSING NOTE
Pt presents to clinic today for prenatal care 32w6d. Pt denies any bleeding, or leakage of fluid at this time. States baby is moving good and has noticed more pelvic pain along with pressure.      Medication Reconciliation: complete  Aster Gray LPN

## 2024-02-16 ENCOUNTER — HOSPITAL ENCOUNTER (OUTPATIENT)
Dept: ULTRASOUND IMAGING | Facility: OTHER | Age: 34
Discharge: HOME | End: 2024-02-16
Payer: COMMERCIAL

## 2024-02-16 DIAGNOSIS — Z34.93 ENCOUNTER FOR PREGNANCY RELATED EXAMINATION, THIRD TRIMESTER: ICD-10-CM

## 2024-02-16 PROCEDURE — 76816 OB US FOLLOW-UP PER FETUS: CPT

## 2024-02-20 ENCOUNTER — PRENATAL OFFICE VISIT (OUTPATIENT)
Dept: OBGYN | Facility: OTHER | Age: 34
End: 2024-02-20
Attending: STUDENT IN AN ORGANIZED HEALTH CARE EDUCATION/TRAINING PROGRAM
Payer: COMMERCIAL

## 2024-02-20 VITALS
BODY MASS INDEX: 36.21 KG/M2 | SYSTOLIC BLOOD PRESSURE: 116 MMHG | HEART RATE: 80 BPM | WEIGHT: 204.4 LBS | DIASTOLIC BLOOD PRESSURE: 62 MMHG

## 2024-02-20 DIAGNOSIS — N96 RECURRENT PREGNANCY LOSS: ICD-10-CM

## 2024-02-20 DIAGNOSIS — Z34.93 THIRD TRIMESTER PREGNANCY: Primary | ICD-10-CM

## 2024-02-20 PROCEDURE — 99207 PR OB VISIT-NO CHARGE - GICH ONLY: CPT | Performed by: STUDENT IN AN ORGANIZED HEALTH CARE EDUCATION/TRAINING PROGRAM

## 2024-02-20 PROCEDURE — 90678 RSV VACC PREF BIVALENT IM: CPT | Performed by: STUDENT IN AN ORGANIZED HEALTH CARE EDUCATION/TRAINING PROGRAM

## 2024-02-20 PROCEDURE — 90471 IMMUNIZATION ADMIN: CPT | Performed by: STUDENT IN AN ORGANIZED HEALTH CARE EDUCATION/TRAINING PROGRAM

## 2024-02-20 RX ORDER — MULTIVITAMIN WITH IRON
1 TABLET ORAL DAILY
Qty: 30 TABLET | Refills: 1 | Status: SHIPPED | OUTPATIENT
Start: 2024-02-20 | End: 2024-03-19

## 2024-02-20 ASSESSMENT — ANXIETY QUESTIONNAIRES
GAD7 TOTAL SCORE: 14
6. BECOMING EASILY ANNOYED OR IRRITABLE: MORE THAN HALF THE DAYS
1. FEELING NERVOUS, ANXIOUS, OR ON EDGE: MORE THAN HALF THE DAYS
8. IF YOU CHECKED OFF ANY PROBLEMS, HOW DIFFICULT HAVE THESE MADE IT FOR YOU TO DO YOUR WORK, TAKE CARE OF THINGS AT HOME, OR GET ALONG WITH OTHER PEOPLE?: SOMEWHAT DIFFICULT
2. NOT BEING ABLE TO STOP OR CONTROL WORRYING: SEVERAL DAYS
GAD7 TOTAL SCORE: 14
3. WORRYING TOO MUCH ABOUT DIFFERENT THINGS: NEARLY EVERY DAY
5. BEING SO RESTLESS THAT IT IS HARD TO SIT STILL: SEVERAL DAYS
4. TROUBLE RELAXING: NEARLY EVERY DAY
7. FEELING AFRAID AS IF SOMETHING AWFUL MIGHT HAPPEN: MORE THAN HALF THE DAYS
IF YOU CHECKED OFF ANY PROBLEMS ON THIS QUESTIONNAIRE, HOW DIFFICULT HAVE THESE PROBLEMS MADE IT FOR YOU TO DO YOUR WORK, TAKE CARE OF THINGS AT HOME, OR GET ALONG WITH OTHER PEOPLE: SOMEWHAT DIFFICULT
7. FEELING AFRAID AS IF SOMETHING AWFUL MIGHT HAPPEN: MORE THAN HALF THE DAYS
GAD7 TOTAL SCORE: 14

## 2024-02-20 ASSESSMENT — PATIENT HEALTH QUESTIONNAIRE - PHQ9
SUM OF ALL RESPONSES TO PHQ QUESTIONS 1-9: 9
SUM OF ALL RESPONSES TO PHQ QUESTIONS 1-9: 9
10. IF YOU CHECKED OFF ANY PROBLEMS, HOW DIFFICULT HAVE THESE PROBLEMS MADE IT FOR YOU TO DO YOUR WORK, TAKE CARE OF THINGS AT HOME, OR GET ALONG WITH OTHER PEOPLE: SOMEWHAT DIFFICULT

## 2024-02-20 NOTE — PROGRESS NOTES
Return OB Visit    S: Ms. Chase is feeling well today. She has no acute concerns. Denies leaking of fluid, vaginal bleeding, painful contractions. Notes fetal movements.    O: /62   Pulse 80   Wt 92.7 kg (204 lb 6.4 oz)   LMP 2023 (Exact Date)   BMI 36.21 kg/m    Gen: Well-appearing, NAD    FH 35 cm   bpm    Assessment:  Ms. Carol Chase is a 33 year old yo  here for an OB follow up visit. This pregnancy is complicated by limited prenatal care, history of recurrent miscarriages, depression and prior SI.     Plan:  # Routine Prenatal Care  -- Dating: LMP=11 week US FRANCIA: 3/28/2024  -- PNLs:                B+, Ab screen neg               RPR nr               Hep B S Ag               Hep C neg               Rubella NON immune               HIV neg               Pap: NIL 2024, HPV negative     -- Genetic Screening: declined  -- Anatomy US: 74%ile and normal anatomy per IHS scan  -- Immunizations: Tdap , declines flu, RSV    -- 3rd TM labs including CBC, RPR: Hgb 12.4, Plt 266, RPR  -- 1 hr GTT: 115  -- GBS: Planned for 36 weeks  -- Postpartum Planning: To be discussed   -- Delivery Planning: breastfeeding, still deciding on contraception  -- Return to clinic in  weeks for OB follow up visit  -- Planning to do at next visit: GBS     # History of suicidal ideation  -- Notes she has no concerns today with this.   -- Working with a therapist  -- Plans to start zoloft after delivery     # Ob history  -- pelvis proven to 3600 grams  -- Recurrent pregancy loss: APAS negative, no indication for blood thinners this pregnancy    Mary Bhat MD  OB/GYN  2024 8:21 AM

## 2024-02-20 NOTE — NURSING NOTE
Pt presents to clinic today for prenatal care 34w5d. Pt denies any contractions, bleeding, or leakage of fluid at this time. States baby is moving good.    Medication Reconciliation: complete  Aster Gray LPN

## 2024-02-25 ENCOUNTER — HEALTH MAINTENANCE LETTER (OUTPATIENT)
Age: 34
End: 2024-02-25

## 2024-02-26 ENCOUNTER — PRENATAL OFFICE VISIT (OUTPATIENT)
Dept: OBGYN | Facility: OTHER | Age: 34
End: 2024-02-26
Attending: STUDENT IN AN ORGANIZED HEALTH CARE EDUCATION/TRAINING PROGRAM
Payer: COMMERCIAL

## 2024-02-26 VITALS
HEART RATE: 84 BPM | SYSTOLIC BLOOD PRESSURE: 118 MMHG | WEIGHT: 204.8 LBS | BODY MASS INDEX: 36.28 KG/M2 | DIASTOLIC BLOOD PRESSURE: 80 MMHG

## 2024-02-26 DIAGNOSIS — K64.4 EXTERNAL HEMORRHOIDS: ICD-10-CM

## 2024-02-26 DIAGNOSIS — N96 RECURRENT PREGNANCY LOSS: Primary | ICD-10-CM

## 2024-02-26 DIAGNOSIS — Z34.93 THIRD TRIMESTER PREGNANCY: ICD-10-CM

## 2024-02-26 LAB
C TRACH DNA SPEC QL PROBE+SIG AMP: NEGATIVE
N GONORRHOEA DNA SPEC QL NAA+PROBE: NEGATIVE

## 2024-02-26 PROCEDURE — 99207 PR OB VISIT-NO CHARGE - GICH ONLY: CPT | Performed by: STUDENT IN AN ORGANIZED HEALTH CARE EDUCATION/TRAINING PROGRAM

## 2024-02-26 PROCEDURE — 87491 CHLMYD TRACH DNA AMP PROBE: CPT | Mod: ZL | Performed by: STUDENT IN AN ORGANIZED HEALTH CARE EDUCATION/TRAINING PROGRAM

## 2024-02-26 RX ORDER — BENZOCAINE/MENTHOL 6 MG-10 MG
LOZENGE MUCOUS MEMBRANE 2 TIMES DAILY
Qty: 20 G | Refills: 0 | Status: SHIPPED | OUTPATIENT
Start: 2024-02-26

## 2024-02-26 ASSESSMENT — PATIENT HEALTH QUESTIONNAIRE - PHQ9
10. IF YOU CHECKED OFF ANY PROBLEMS, HOW DIFFICULT HAVE THESE PROBLEMS MADE IT FOR YOU TO DO YOUR WORK, TAKE CARE OF THINGS AT HOME, OR GET ALONG WITH OTHER PEOPLE: VERY DIFFICULT
SUM OF ALL RESPONSES TO PHQ QUESTIONS 1-9: 19
SUM OF ALL RESPONSES TO PHQ QUESTIONS 1-9: 19

## 2024-02-26 NOTE — NURSING NOTE
Pt presents to clinic today for prenatal care 35w4d. Pt denies any contractions, bleeding, or leakage of fluid at this time. States baby is moving good and has noticed cramping.      Medication Reconciliation: complete  Aster Gray LPN
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
## 2024-03-05 ENCOUNTER — PRENATAL OFFICE VISIT (OUTPATIENT)
Dept: OBGYN | Facility: OTHER | Age: 34
End: 2024-03-05
Attending: STUDENT IN AN ORGANIZED HEALTH CARE EDUCATION/TRAINING PROGRAM
Payer: COMMERCIAL

## 2024-03-05 VITALS
SYSTOLIC BLOOD PRESSURE: 122 MMHG | HEART RATE: 80 BPM | BODY MASS INDEX: 36.01 KG/M2 | WEIGHT: 203.3 LBS | DIASTOLIC BLOOD PRESSURE: 70 MMHG

## 2024-03-05 DIAGNOSIS — Z34.93 ENCOUNTER FOR PREGNANCY RELATED EXAMINATION IN THIRD TRIMESTER: Primary | ICD-10-CM

## 2024-03-05 DIAGNOSIS — Z34.93 THIRD TRIMESTER PREGNANCY: ICD-10-CM

## 2024-03-05 DIAGNOSIS — N96 RECURRENT PREGNANCY LOSS: ICD-10-CM

## 2024-03-05 PROCEDURE — 87653 STREP B DNA AMP PROBE: CPT | Mod: ZL | Performed by: STUDENT IN AN ORGANIZED HEALTH CARE EDUCATION/TRAINING PROGRAM

## 2024-03-05 PROCEDURE — 99207 PR OB VISIT-NO CHARGE - GICH ONLY: CPT | Performed by: STUDENT IN AN ORGANIZED HEALTH CARE EDUCATION/TRAINING PROGRAM

## 2024-03-05 ASSESSMENT — PATIENT HEALTH QUESTIONNAIRE - PHQ9
10. IF YOU CHECKED OFF ANY PROBLEMS, HOW DIFFICULT HAVE THESE PROBLEMS MADE IT FOR YOU TO DO YOUR WORK, TAKE CARE OF THINGS AT HOME, OR GET ALONG WITH OTHER PEOPLE: VERY DIFFICULT
SUM OF ALL RESPONSES TO PHQ QUESTIONS 1-9: 18
SUM OF ALL RESPONSES TO PHQ QUESTIONS 1-9: 18

## 2024-03-05 NOTE — PROGRESS NOTES
Return OB Visit    S: Ms. Chase is feeling well today. She has no acute concerns. Denies leaking of fluid, vaginal bleeding, painful contractions. Notes fetal movements     /70   Pulse 80   Wt 92.2 kg (203 lb 4.8 oz)   LMP 2023 (Exact Date)   BMI 36.01 kg/m      Gen: Well-appearing, NAD    FH 37 cm   bpm    Assessment:  Ms. Carol Chase is a 33 year old yo  here for an OB follow up visit. This pregnancy is complicated by limited prenatal care, history of recurrent miscarriages, depression and prior SI.     Plan:  # Routine Prenatal Care  -- Dating: LMP=11 week US FRANCIA: 3/28/2024  -- PNLs:                B+, Ab screen neg               RPR nr               Hep B S Ag               Hep C neg               Rubella NON immune               HIV neg               Pap: NIL 2024, HPV negative     -- Genetic Screening: declined  -- Anatomy US: 74%ile and normal anatomy per IHS scan  -- Immunizations: Tdap , declines flu, RSV    -- 3rd TM labs including CBC, RPR: Hgb 12.4, Plt 266, RPR  -- 1 hr GTT: 115  -- GBS: today  -- Postpartum Planning: To be discussed   -- Delivery Planning: breastfeeding, still deciding on contraception  -- Return to clinic in  weeks for OB follow up visit  -- Planning to do at next visit: PASCUAL     # History of suicidal ideation  -- Notes she has no concerns today with this.   -- Working with a therapist  -- Plans to start zoloft after delivery     # Ob history  -- pelvis proven to 3600 grams  -- Recurrent pregancy loss: APAS negative, no indication for blood thinners this pregnancy    Mary Bhat MD  OB/GYN  3/5/2024 8:28 AM

## 2024-03-05 NOTE — NURSING NOTE
Pt presents to clinic today for prenatal care 36w5d. Pt denies any contractions, bleeding, or leakage of fluid at this time. States baby is moving good.    Medication Reconciliation: complete  Aster Gray LPN

## 2024-03-06 LAB — GP B STREP DNA SPEC QL NAA+PROBE: POSITIVE

## 2024-03-11 ENCOUNTER — PRENATAL OFFICE VISIT (OUTPATIENT)
Dept: OBGYN | Facility: OTHER | Age: 34
End: 2024-03-11
Payer: COMMERCIAL

## 2024-03-11 VITALS
SYSTOLIC BLOOD PRESSURE: 118 MMHG | BODY MASS INDEX: 35.94 KG/M2 | WEIGHT: 202.9 LBS | HEART RATE: 82 BPM | DIASTOLIC BLOOD PRESSURE: 84 MMHG

## 2024-03-11 DIAGNOSIS — Z34.93 ENCOUNTER FOR PREGNANCY RELATED EXAMINATION IN THIRD TRIMESTER: Primary | ICD-10-CM

## 2024-03-11 PROCEDURE — 99207 PR OB VISIT-NO CHARGE - GICH ONLY: CPT

## 2024-03-11 ASSESSMENT — PATIENT HEALTH QUESTIONNAIRE - PHQ9
SUM OF ALL RESPONSES TO PHQ QUESTIONS 1-9: 16
SUM OF ALL RESPONSES TO PHQ QUESTIONS 1-9: 16
10. IF YOU CHECKED OFF ANY PROBLEMS, HOW DIFFICULT HAVE THESE PROBLEMS MADE IT FOR YOU TO DO YOUR WORK, TAKE CARE OF THINGS AT HOME, OR GET ALONG WITH OTHER PEOPLE: SOMEWHAT DIFFICULT

## 2024-03-11 NOTE — NURSING NOTE
Pt presents to clinic today for prenatal care 37w4d. Pt denies any bleeding, or leakage of fluid at this time. States baby is moving good and has had some contractions.      Medication Reconciliation: complete  Aster Gray LPN

## 2024-03-11 NOTE — PROGRESS NOTES
OB Visit    S: Patient is feeling well still some pelvic pain. Denies LOF, VB, or regular Ctx. + FM.    Concerns: none    O: /84   Pulse 82   Wt 92 kg (202 lb 14.4 oz)   LMP 2023 (Exact Date)   BMI 35.94 kg/m    Gen: Well-appearing, NAD  FH: 38  FHT: 143  CX: closed       A/P:  Carol Chase is a 33 year old  at 37w4d by LMP CW 11 week US, here for return OB visit.  -- Recurrent pregancy loss: APAS negative, no indication for blood thinners this pregnancy   # History of suicidal ideation  -- Notes she has no concerns today with this.   -- Working with a therapist  -- Plans to start zoloft after delivery    PNC: We discussed the below recommendations for planning, testing, and add on options as well as detailed any increased risk based on patient history. The subsequent choices and results if any are reflected below.     Prenatal labs WNL, Rh +, Rubella non-immune, , 3rd Trimester HGB 12.4, GBS +   Rhogam: NA  Genetics: declined  Imaging: WNL  Immunizations: TDAP 24, FLU declined, RSV 24  Delivery Plan:  breastfeeding, discussed GBS + status and return precautions. Given WHBC number   BC after delivery: TBD     RTC 1 week      RADHA Xie  3/11/2024 8:58 AM

## 2024-03-19 ENCOUNTER — PRENATAL OFFICE VISIT (OUTPATIENT)
Dept: OBGYN | Facility: OTHER | Age: 34
End: 2024-03-19
Attending: STUDENT IN AN ORGANIZED HEALTH CARE EDUCATION/TRAINING PROGRAM
Payer: COMMERCIAL

## 2024-03-19 VITALS
WEIGHT: 204 LBS | BODY MASS INDEX: 36.14 KG/M2 | HEART RATE: 84 BPM | DIASTOLIC BLOOD PRESSURE: 68 MMHG | SYSTOLIC BLOOD PRESSURE: 110 MMHG

## 2024-03-19 DIAGNOSIS — N96 RECURRENT PREGNANCY LOSS: Primary | ICD-10-CM

## 2024-03-19 DIAGNOSIS — Z34.93 THIRD TRIMESTER PREGNANCY: ICD-10-CM

## 2024-03-19 PROCEDURE — 99207 PR OB VISIT-NO CHARGE - GICH ONLY: CPT | Performed by: STUDENT IN AN ORGANIZED HEALTH CARE EDUCATION/TRAINING PROGRAM

## 2024-03-19 RX ORDER — PNV NO.95/FERROUS FUM/FOLIC AC 28MG-0.8MG
250 TABLET ORAL DAILY
Qty: 90 TABLET | Refills: 1 | Status: SHIPPED | OUTPATIENT
Start: 2024-03-19

## 2024-03-19 ASSESSMENT — PATIENT HEALTH QUESTIONNAIRE - PHQ9
SUM OF ALL RESPONSES TO PHQ QUESTIONS 1-9: 18
10. IF YOU CHECKED OFF ANY PROBLEMS, HOW DIFFICULT HAVE THESE PROBLEMS MADE IT FOR YOU TO DO YOUR WORK, TAKE CARE OF THINGS AT HOME, OR GET ALONG WITH OTHER PEOPLE: VERY DIFFICULT
SUM OF ALL RESPONSES TO PHQ QUESTIONS 1-9: 18

## 2024-03-19 NOTE — NURSING NOTE
Pt presents to clinic today for prenatal care 38w5d. Pt denies any bleeding, or leakage of fluid at this time. States baby is moving good has noticed contractions and pelvic pressure.      Medication Reconciliation: complete  Aster Gray LPN

## 2024-03-19 NOTE — PROGRESS NOTES
Return OB Visit    S: Carol is feeling well today. She is getting eager to meet baby. She has no acute concerns. Denies leaking of fluid, vaginal bleeding, painful contractions. Notes fetal movements     O: /68   Pulse 84   Wt 92.5 kg (204 lb)   LMP 2023 (Exact Date)   BMI 36.14 kg/m    Gen: Well-appearing, NAD    FH 39 cm   bpm  SVE closed/thick/high    Assessment:  Ms. Carol Chase is a 33 year old yo  here for an OB follow up visit. This pregnancy is complicated by limited prenatal care, history of recurrent miscarriages, depression and prior SI.     Plan:  # Routine Prenatal Care  -- Dating: LMP=11 week US FRANCIA: 3/28/2024  -- PNLs:                B+, Ab screen neg               RPR nr               Hep B S Ag               Hep C neg               Rubella NON immune               HIV neg               Pap: NIL 2024, HPV negative     -- Genetic Screening: declined  -- Anatomy US: 74%ile and normal anatomy per [facility] scan  -- Immunizations: Tdap , declines flu, RSV    -- 3rd TM labs including CBC, RPR: Hgb 12.4, Plt 266, RPR  -- 1 hr GTT: 115  -- GBS: positive  -- Postpartum Planning: breastfeeding, undecided on contraception  -- Delivery Planning: breastfeeding, still deciding on contraception  -- Return to clinic in  1 weeks for OB follow up visit  -- Planning to do at next visit: SVE     # History of suicidal ideation  -- Notes she has no concerns today with this.   -- Working with a therapist  -- Plans to start zoloft after delivery     # Ob history  -- pelvis proven to 3600 grams  -- Recurrent pregancy loss: APAS negative, no indication for blood thinners this pregnancy    Mary Bhat MD  OB/GYN  3/19/2024 8:43 AM

## 2024-03-19 NOTE — TELEPHONE ENCOUNTER
[facility] sent Rx request for the following:      Requested Prescriptions   Pending Prescriptions Disp Refills    Magnesium Oxide 250 MG tablet [Pharmacy Med Name: MAGNESIUM OXIDE 250 MG TABLET] 90 tablet 1     Sig: TAKE 1 TABLET BY MOUTH EVERY DAY       There is no refill protocol information for this order          Last Prescription Date:   2/20/2024  Last Fill Qty/Refills:         30, R-1    Last Office Visit:              3/19/24 ALD   Future Office visit:           not scheduled    Unable to complete prescription refill per RN Medication Refill Policy    Karon Palma RN on 3/19/2024 at 8:38 AM

## 2024-03-23 ENCOUNTER — HOSPITAL ENCOUNTER (INPATIENT)
Facility: OTHER | Age: 34
LOS: 2 days | Discharge: HOME | End: 2024-03-25
Attending: OBSTETRICS & GYNECOLOGY | Admitting: OBSTETRICS & GYNECOLOGY
Payer: COMMERCIAL

## 2024-03-23 DIAGNOSIS — F41.1 GENERALIZED ANXIETY DISORDER: ICD-10-CM

## 2024-03-23 PROBLEM — Z34.90 PREGNANCY: Status: ACTIVE | Noted: 2024-03-23

## 2024-03-23 PROBLEM — Z36.89 ENCOUNTER FOR TRIAGE IN PREGNANT PATIENT: Status: ACTIVE | Noted: 2024-03-23

## 2024-03-23 LAB
ABO/RH(D): NORMAL
ANTIBODY SCREEN: NEGATIVE
ERYTHROCYTE [DISTWIDTH] IN BLOOD BY AUTOMATED COUNT: 13.4 % (ref 10–15)
HCT VFR BLD AUTO: 39.6 % (ref 35–47)
HGB BLD-MCNC: 13.6 G/DL (ref 11.7–15.7)
HOLD SPECIMEN: NORMAL
MCH RBC QN AUTO: 30.8 PG (ref 26.5–33)
MCHC RBC AUTO-ENTMCNC: 34.3 G/DL (ref 31.5–36.5)
MCV RBC AUTO: 90 FL (ref 78–100)
PLATELET # BLD AUTO: 200 10E3/UL (ref 150–450)
RBC # BLD AUTO: 4.42 10E6/UL (ref 3.8–5.2)
SPECIMEN EXPIRATION DATE: NORMAL
WBC # BLD AUTO: 10.5 10E3/UL (ref 4–11)

## 2024-03-23 PROCEDURE — 250N000011 HC RX IP 250 OP 636: Performed by: OBSTETRICS & GYNECOLOGY

## 2024-03-23 PROCEDURE — 258N000003 HC RX IP 258 OP 636: Performed by: OBSTETRICS & GYNECOLOGY

## 2024-03-23 PROCEDURE — 250N000013 HC RX MED GY IP 250 OP 250 PS 637: Performed by: OBSTETRICS & GYNECOLOGY

## 2024-03-23 PROCEDURE — 86900 BLOOD TYPING SEROLOGIC ABO: CPT | Performed by: OBSTETRICS & GYNECOLOGY

## 2024-03-23 PROCEDURE — 722N000001 HC LABOR CARE VAGINAL DELIVERY SINGLE

## 2024-03-23 PROCEDURE — 0HQ9XZZ REPAIR PERINEUM SKIN, EXTERNAL APPROACH: ICD-10-PCS | Performed by: OBSTETRICS & GYNECOLOGY

## 2024-03-23 PROCEDURE — 59400 OBSTETRICAL CARE: CPT | Performed by: OBSTETRICS & GYNECOLOGY

## 2024-03-23 PROCEDURE — 120N000001 HC R&B MED SURG/OB

## 2024-03-23 PROCEDURE — 85027 COMPLETE CBC AUTOMATED: CPT | Performed by: OBSTETRICS & GYNECOLOGY

## 2024-03-23 PROCEDURE — 250N000009 HC RX 250: Performed by: OBSTETRICS & GYNECOLOGY

## 2024-03-23 PROCEDURE — 86780 TREPONEMA PALLIDUM: CPT | Performed by: OBSTETRICS & GYNECOLOGY

## 2024-03-23 RX ORDER — OXYTOCIN/0.9 % SODIUM CHLORIDE 30/500 ML
340 PLASTIC BAG, INJECTION (ML) INTRAVENOUS CONTINUOUS PRN
Status: DISCONTINUED | OUTPATIENT
Start: 2024-03-23 | End: 2024-03-23 | Stop reason: HOSPADM

## 2024-03-23 RX ORDER — IBUPROFEN 400 MG/1
800 TABLET, FILM COATED ORAL EVERY 6 HOURS PRN
Status: DISCONTINUED | OUTPATIENT
Start: 2024-03-23 | End: 2024-03-25 | Stop reason: HOSPADM

## 2024-03-23 RX ORDER — KETOROLAC TROMETHAMINE 30 MG/ML
30 INJECTION, SOLUTION INTRAMUSCULAR; INTRAVENOUS
Status: DISCONTINUED | OUTPATIENT
Start: 2024-03-23 | End: 2024-03-25 | Stop reason: HOSPADM

## 2024-03-23 RX ORDER — NALOXONE HYDROCHLORIDE 0.4 MG/ML
0.2 INJECTION, SOLUTION INTRAMUSCULAR; INTRAVENOUS; SUBCUTANEOUS
Status: DISCONTINUED | OUTPATIENT
Start: 2024-03-23 | End: 2024-03-23 | Stop reason: HOSPADM

## 2024-03-23 RX ORDER — PENICILLIN G 3000000 [IU]/50ML
3 INJECTION, SOLUTION INTRAVENOUS EVERY 4 HOURS
Status: DISCONTINUED | OUTPATIENT
Start: 2024-03-23 | End: 2024-03-23 | Stop reason: SINTOL

## 2024-03-23 RX ORDER — NALOXONE HYDROCHLORIDE 0.4 MG/ML
0.2 INJECTION, SOLUTION INTRAMUSCULAR; INTRAVENOUS; SUBCUTANEOUS
Status: DISCONTINUED | OUTPATIENT
Start: 2024-03-23 | End: 2024-03-25 | Stop reason: HOSPADM

## 2024-03-23 RX ORDER — BISACODYL 10 MG
10 SUPPOSITORY, RECTAL RECTAL DAILY PRN
Status: DISCONTINUED | OUTPATIENT
Start: 2024-03-23 | End: 2024-03-25 | Stop reason: HOSPADM

## 2024-03-23 RX ORDER — DOCUSATE SODIUM 100 MG/1
100 CAPSULE, LIQUID FILLED ORAL DAILY
Status: DISCONTINUED | OUTPATIENT
Start: 2024-03-23 | End: 2024-03-25 | Stop reason: HOSPADM

## 2024-03-23 RX ORDER — IBUPROFEN 400 MG/1
800 TABLET, FILM COATED ORAL
Status: DISCONTINUED | OUTPATIENT
Start: 2024-03-23 | End: 2024-03-25 | Stop reason: HOSPADM

## 2024-03-23 RX ORDER — METHYLERGONOVINE MALEATE 0.2 MG/ML
200 INJECTION INTRAVENOUS
Status: DISCONTINUED | OUTPATIENT
Start: 2024-03-23 | End: 2024-03-25 | Stop reason: HOSPADM

## 2024-03-23 RX ORDER — SODIUM CHLORIDE, SODIUM LACTATE, POTASSIUM CHLORIDE, CALCIUM CHLORIDE 600; 310; 30; 20 MG/100ML; MG/100ML; MG/100ML; MG/100ML
INJECTION, SOLUTION INTRAVENOUS CONTINUOUS
Status: DISCONTINUED | OUTPATIENT
Start: 2024-03-23 | End: 2024-03-23 | Stop reason: HOSPADM

## 2024-03-23 RX ORDER — CEFAZOLIN SODIUM/WATER 2 G/20 ML
2 SYRINGE (ML) INTRAVENOUS ONCE
Status: COMPLETED | OUTPATIENT
Start: 2024-03-23 | End: 2024-03-23

## 2024-03-23 RX ORDER — SODIUM CHLORIDE, SODIUM LACTATE, POTASSIUM CHLORIDE, CALCIUM CHLORIDE 600; 310; 30; 20 MG/100ML; MG/100ML; MG/100ML; MG/100ML
INJECTION, SOLUTION INTRAVENOUS CONTINUOUS PRN
Status: DISCONTINUED | OUTPATIENT
Start: 2024-03-23 | End: 2024-03-23 | Stop reason: HOSPADM

## 2024-03-23 RX ORDER — OXYTOCIN/0.9 % SODIUM CHLORIDE 30/500 ML
340 PLASTIC BAG, INJECTION (ML) INTRAVENOUS CONTINUOUS PRN
Status: DISCONTINUED | OUTPATIENT
Start: 2024-03-23 | End: 2024-03-25 | Stop reason: HOSPADM

## 2024-03-23 RX ORDER — LOPERAMIDE HCL 2 MG
4 CAPSULE ORAL
Status: DISCONTINUED | OUTPATIENT
Start: 2024-03-23 | End: 2024-03-25 | Stop reason: HOSPADM

## 2024-03-23 RX ORDER — CARBOPROST TROMETHAMINE 250 UG/ML
250 INJECTION, SOLUTION INTRAMUSCULAR
Status: DISCONTINUED | OUTPATIENT
Start: 2024-03-23 | End: 2024-03-25 | Stop reason: HOSPADM

## 2024-03-23 RX ORDER — NALOXONE HYDROCHLORIDE 0.4 MG/ML
0.4 INJECTION, SOLUTION INTRAMUSCULAR; INTRAVENOUS; SUBCUTANEOUS
Status: DISCONTINUED | OUTPATIENT
Start: 2024-03-23 | End: 2024-03-23 | Stop reason: HOSPADM

## 2024-03-23 RX ORDER — LIDOCAINE 40 MG/G
CREAM TOPICAL
Status: DISCONTINUED | OUTPATIENT
Start: 2024-03-23 | End: 2024-03-23 | Stop reason: HOSPADM

## 2024-03-23 RX ORDER — TRANEXAMIC ACID 10 MG/ML
1 INJECTION, SOLUTION INTRAVENOUS EVERY 30 MIN PRN
Status: DISCONTINUED | OUTPATIENT
Start: 2024-03-23 | End: 2024-03-23 | Stop reason: HOSPADM

## 2024-03-23 RX ORDER — OXYTOCIN/0.9 % SODIUM CHLORIDE 30/500 ML
1-24 PLASTIC BAG, INJECTION (ML) INTRAVENOUS CONTINUOUS
Status: DISCONTINUED | OUTPATIENT
Start: 2024-03-23 | End: 2024-03-23 | Stop reason: HOSPADM

## 2024-03-23 RX ORDER — MODIFIED LANOLIN
OINTMENT (GRAM) TOPICAL
Status: DISCONTINUED | OUTPATIENT
Start: 2024-03-23 | End: 2024-03-25 | Stop reason: HOSPADM

## 2024-03-23 RX ORDER — PENICILLIN G POTASSIUM 5000000 [IU]/1
5 INJECTION, POWDER, FOR SOLUTION INTRAMUSCULAR; INTRAVENOUS ONCE
Status: DISCONTINUED | OUTPATIENT
Start: 2024-03-23 | End: 2024-03-23 | Stop reason: SINTOL

## 2024-03-23 RX ORDER — METOCLOPRAMIDE HYDROCHLORIDE 5 MG/ML
10 INJECTION INTRAMUSCULAR; INTRAVENOUS EVERY 6 HOURS PRN
Status: DISCONTINUED | OUTPATIENT
Start: 2024-03-23 | End: 2024-03-23 | Stop reason: HOSPADM

## 2024-03-23 RX ORDER — NALOXONE HYDROCHLORIDE 0.4 MG/ML
0.4 INJECTION, SOLUTION INTRAMUSCULAR; INTRAVENOUS; SUBCUTANEOUS
Status: DISCONTINUED | OUTPATIENT
Start: 2024-03-23 | End: 2024-03-25 | Stop reason: HOSPADM

## 2024-03-23 RX ORDER — OXYTOCIN/0.9 % SODIUM CHLORIDE 30/500 ML
100-340 PLASTIC BAG, INJECTION (ML) INTRAVENOUS CONTINUOUS PRN
Status: DISCONTINUED | OUTPATIENT
Start: 2024-03-23 | End: 2024-03-25 | Stop reason: HOSPADM

## 2024-03-23 RX ORDER — OXYCODONE HYDROCHLORIDE 5 MG/1
5 TABLET ORAL EVERY 4 HOURS PRN
Status: DISCONTINUED | OUTPATIENT
Start: 2024-03-23 | End: 2024-03-25 | Stop reason: HOSPADM

## 2024-03-23 RX ORDER — PROCHLORPERAZINE 25 MG
25 SUPPOSITORY, RECTAL RECTAL EVERY 12 HOURS PRN
Status: DISCONTINUED | OUTPATIENT
Start: 2024-03-23 | End: 2024-03-23 | Stop reason: HOSPADM

## 2024-03-23 RX ORDER — PROCHLORPERAZINE MALEATE 10 MG
10 TABLET ORAL EVERY 6 HOURS PRN
Status: DISCONTINUED | OUTPATIENT
Start: 2024-03-23 | End: 2024-03-23 | Stop reason: HOSPADM

## 2024-03-23 RX ORDER — TRANEXAMIC ACID 10 MG/ML
1 INJECTION, SOLUTION INTRAVENOUS EVERY 30 MIN PRN
Status: DISCONTINUED | OUTPATIENT
Start: 2024-03-23 | End: 2024-03-25 | Stop reason: HOSPADM

## 2024-03-23 RX ORDER — MISOPROSTOL 100 UG/1
400 TABLET ORAL
Status: DISCONTINUED | OUTPATIENT
Start: 2024-03-23 | End: 2024-03-25 | Stop reason: HOSPADM

## 2024-03-23 RX ORDER — CEFAZOLIN SODIUM 1 G/50ML
1 INJECTION, SOLUTION INTRAVENOUS EVERY 8 HOURS
Status: DISCONTINUED | OUTPATIENT
Start: 2024-03-23 | End: 2024-03-23

## 2024-03-23 RX ORDER — ACETAMINOPHEN 325 MG/1
650 TABLET ORAL EVERY 4 HOURS PRN
Status: DISCONTINUED | OUTPATIENT
Start: 2024-03-23 | End: 2024-03-25 | Stop reason: HOSPADM

## 2024-03-23 RX ORDER — LOPERAMIDE HCL 2 MG
2 CAPSULE ORAL
Status: DISCONTINUED | OUTPATIENT
Start: 2024-03-23 | End: 2024-03-23 | Stop reason: HOSPADM

## 2024-03-23 RX ORDER — CARBOPROST TROMETHAMINE 250 UG/ML
250 INJECTION, SOLUTION INTRAMUSCULAR
Status: DISCONTINUED | OUTPATIENT
Start: 2024-03-23 | End: 2024-03-23 | Stop reason: HOSPADM

## 2024-03-23 RX ORDER — FENTANYL CITRATE 50 UG/ML
50 INJECTION, SOLUTION INTRAMUSCULAR; INTRAVENOUS
Status: DISCONTINUED | OUTPATIENT
Start: 2024-03-23 | End: 2024-03-25 | Stop reason: HOSPADM

## 2024-03-23 RX ORDER — METHYLERGONOVINE MALEATE 0.2 MG/ML
200 INJECTION INTRAVENOUS
Status: DISCONTINUED | OUTPATIENT
Start: 2024-03-23 | End: 2024-03-23 | Stop reason: HOSPADM

## 2024-03-23 RX ORDER — OXYTOCIN 10 [USP'U]/ML
10 INJECTION, SOLUTION INTRAMUSCULAR; INTRAVENOUS
Status: DISCONTINUED | OUTPATIENT
Start: 2024-03-23 | End: 2024-03-25 | Stop reason: HOSPADM

## 2024-03-23 RX ORDER — ACETAMINOPHEN 325 MG/1
650 TABLET ORAL EVERY 4 HOURS PRN
Status: DISCONTINUED | OUTPATIENT
Start: 2024-03-23 | End: 2024-03-23 | Stop reason: HOSPADM

## 2024-03-23 RX ORDER — HYDROCORTISONE 25 MG/G
CREAM TOPICAL 3 TIMES DAILY PRN
Status: DISCONTINUED | OUTPATIENT
Start: 2024-03-23 | End: 2024-03-25 | Stop reason: HOSPADM

## 2024-03-23 RX ORDER — ONDANSETRON 2 MG/ML
4 INJECTION INTRAMUSCULAR; INTRAVENOUS EVERY 6 HOURS PRN
Status: DISCONTINUED | OUTPATIENT
Start: 2024-03-23 | End: 2024-03-23 | Stop reason: HOSPADM

## 2024-03-23 RX ORDER — METOCLOPRAMIDE 10 MG/1
10 TABLET ORAL EVERY 6 HOURS PRN
Status: DISCONTINUED | OUTPATIENT
Start: 2024-03-23 | End: 2024-03-23 | Stop reason: HOSPADM

## 2024-03-23 RX ORDER — LOPERAMIDE HCL 2 MG
4 CAPSULE ORAL
Status: DISCONTINUED | OUTPATIENT
Start: 2024-03-23 | End: 2024-03-23 | Stop reason: HOSPADM

## 2024-03-23 RX ORDER — ONDANSETRON 4 MG/1
4 TABLET, ORALLY DISINTEGRATING ORAL EVERY 6 HOURS PRN
Status: DISCONTINUED | OUTPATIENT
Start: 2024-03-23 | End: 2024-03-23 | Stop reason: HOSPADM

## 2024-03-23 RX ORDER — MISOPROSTOL 100 UG/1
400 TABLET ORAL
Status: DISCONTINUED | OUTPATIENT
Start: 2024-03-23 | End: 2024-03-23 | Stop reason: HOSPADM

## 2024-03-23 RX ORDER — LOPERAMIDE HCL 2 MG
2 CAPSULE ORAL
Status: DISCONTINUED | OUTPATIENT
Start: 2024-03-23 | End: 2024-03-25 | Stop reason: HOSPADM

## 2024-03-23 RX ORDER — OXYTOCIN 10 [USP'U]/ML
10 INJECTION, SOLUTION INTRAMUSCULAR; INTRAVENOUS
Status: DISCONTINUED | OUTPATIENT
Start: 2024-03-23 | End: 2024-03-23 | Stop reason: HOSPADM

## 2024-03-23 RX ADMIN — Medication 2 MILLI-UNITS/MIN: at 18:58

## 2024-03-23 RX ADMIN — ACETAMINOPHEN 650 MG: 325 TABLET, FILM COATED ORAL at 22:24

## 2024-03-23 RX ADMIN — KETOROLAC TROMETHAMINE 30 MG: 30 INJECTION, SOLUTION INTRAMUSCULAR at 20:59

## 2024-03-23 RX ADMIN — SODIUM CHLORIDE, POTASSIUM CHLORIDE, SODIUM LACTATE AND CALCIUM CHLORIDE: 600; 310; 30; 20 INJECTION, SOLUTION INTRAVENOUS at 18:40

## 2024-03-23 RX ADMIN — Medication 2 G: at 12:54

## 2024-03-23 RX ADMIN — DOCUSATE SODIUM 100 MG: 100 CAPSULE, LIQUID FILLED ORAL at 22:24

## 2024-03-23 RX ADMIN — Medication: at 23:05

## 2024-03-23 ASSESSMENT — ACTIVITIES OF DAILY LIVING (ADL)
DIFFICULTY_EATING/SWALLOWING: NO
ADLS_ACUITY_SCORE: 21
FALL_HISTORY_WITHIN_LAST_SIX_MONTHS: NO
ADLS_ACUITY_SCORE: 21
WALKING_OR_CLIMBING_STAIRS_DIFFICULTY: NO
ADLS_ACUITY_SCORE: 21
HEARING_DIFFICULTY_OR_DEAF: NO
DOING_ERRANDS_INDEPENDENTLY_DIFFICULTY: NO
CHANGE_IN_FUNCTIONAL_STATUS_SINCE_ONSET_OF_CURRENT_ILLNESS/INJURY: NO
ADLS_ACUITY_SCORE: 21
TOILETING_ISSUES: NO
VISION_MANAGEMENT: GLASSES AND CONTACTS
ADLS_ACUITY_SCORE: 21
ADLS_ACUITY_SCORE: 21
CONCENTRATING,_REMEMBERING_OR_MAKING_DECISIONS_DIFFICULTY: YES
WEAR_GLASSES_OR_BLIND: YES
ADLS_ACUITY_SCORE: 21
DRESSING/BATHING_DIFFICULTY: NO
ADLS_ACUITY_SCORE: 21
DIFFICULTY_COMMUNICATING: NO
ADLS_ACUITY_SCORE: 21
ADLS_ACUITY_SCORE: 21

## 2024-03-23 NOTE — PROGRESS NOTES
33year old female at 39.2 weeks gestation admitted to room 405 with irregular uterine contractions and reports rupture membranes at 0930 AM and reports large amount clear fluid running down her legs. SVE 2.5 cm, 50% effaced, -2 station, vertex, FHT at 1124-142, external FH monitor applied.

## 2024-03-23 NOTE — PROGRESS NOTES
Patient coping well with uterine contractions using nitrous, patient declines Fentanyl IV at this time. Sully ROJAS notified patient status.

## 2024-03-24 LAB
HGB BLD-MCNC: 12.7 G/DL (ref 11.7–15.7)
T PALLIDUM AB SER QL: NONREACTIVE

## 2024-03-24 PROCEDURE — 120N000001 HC R&B MED SURG/OB

## 2024-03-24 PROCEDURE — 36415 COLL VENOUS BLD VENIPUNCTURE: CPT | Performed by: OBSTETRICS & GYNECOLOGY

## 2024-03-24 PROCEDURE — 85018 HEMOGLOBIN: CPT | Performed by: OBSTETRICS & GYNECOLOGY

## 2024-03-24 PROCEDURE — 250N000013 HC RX MED GY IP 250 OP 250 PS 637: Performed by: OBSTETRICS & GYNECOLOGY

## 2024-03-24 RX ADMIN — ACETAMINOPHEN 650 MG: 325 TABLET, FILM COATED ORAL at 14:26

## 2024-03-24 RX ADMIN — DOCUSATE SODIUM 100 MG: 100 CAPSULE, LIQUID FILLED ORAL at 10:07

## 2024-03-24 RX ADMIN — IBUPROFEN 800 MG: 400 TABLET, FILM COATED ORAL at 10:07

## 2024-03-24 RX ADMIN — IBUPROFEN 800 MG: 400 TABLET, FILM COATED ORAL at 02:33

## 2024-03-24 RX ADMIN — ACETAMINOPHEN 650 MG: 325 TABLET, FILM COATED ORAL at 07:37

## 2024-03-24 RX ADMIN — SERTRALINE HYDROCHLORIDE 50 MG: 50 TABLET ORAL at 10:07

## 2024-03-24 RX ADMIN — IBUPROFEN 800 MG: 400 TABLET, FILM COATED ORAL at 16:30

## 2024-03-24 RX ADMIN — ACETAMINOPHEN 650 MG: 325 TABLET, FILM COATED ORAL at 20:58

## 2024-03-24 ASSESSMENT — ACTIVITIES OF DAILY LIVING (ADL)
ADLS_ACUITY_SCORE: 21

## 2024-03-24 NOTE — H&P
"[facility] Labor and Delivery History and Physical    Carol Chase MRN# [id number]   Age: 33 year old YOB: 1990     Date of Admission:  3/23/2024    Primary care provider: No Ref-Primary, Physician           Chief Complaint:   Carol Chase is a 33 year old female who is 39w2d pregnant and being admitted for active labor management.          Pregnancy history:     OBSTETRIC HISTORY:    OB History    Para Term  AB Living   7 2 2 0 4 2   SAB IAB Ectopic Multiple Live Births   4 0 0 1 2      # Outcome Date GA Lbr Jv/2nd Weight Sex Delivery Anes PTL Lv   7 Current            6 SAB 10/04/21 6w4d    SAB      5 SAB 10/04/21 12w4d    SAB      4A SAB 2020     SAB      4B SAB      SAB      3 Term 14 41w4d / 00:14 3.614 kg (7 lb 15.5 oz) M Vag-Spont EPI, Other N HENRY      Name: Armando      Apgar1: 8  Apgar5: 9   2 Term 11 40w6d 18:39 3.147 kg (6 lb 15 oz) F Vag-Forceps EPI N HENRY      Name: Honorio      Apgar1: 8  Apgar5: 9   1 SAB 09 12w0d    SAB   FD       EDC: Estimated Date of Delivery: 3/28/24    Prenatal Labs:   Lab Results   Component Value Date    AS Negative 2024    CHPCRT Negative 2023    GCPCRT Negative 2023    HGB 13.6 2024       GBS Status:   No results found for: \"GBS\"    Active Problem List  Patient Active Problem List   Diagnosis    Cervical dysplasia complicating pregnancy    Hemorrhoids, external without complications    Keratoconjunctivitis of left eye    Left ear pain    Myopia with astigmatism    Overweight (BMI 25.0-29.9)    Spondylolisthesis of lumbar region    Spontaneous     Viral upper respiratory tract infection    White without pressure of peripheral retina of both eyes    Major depressive disorder, recurrent episode, mild (H24)    Encounter for triage in pregnant patient    Pregnancy       Medication Prior to Admission  Medications Prior to Admission   Medication Sig Dispense Refill Last Dose    " "hydrocortisone (CORTAID) 1 % external cream Apply topically 2 times daily 20 g 0 More than a month    Magnesium Oxide 250 MG tablet TAKE 1 TABLET BY MOUTH EVERY DAY 90 tablet 1 Past Month    Minoxidil (ROGAINE MENS EXTRA STRENGTH) 5 % FOAM    More than a month    Prenatal 27-1 MG TABS Take 1 tablet by mouth daily 90 tablet 3 3/23/2024   .        Maternal Past Medical History:     Past Medical History:   Diagnosis Date    Depressive disorder     Postpartum depression                        Family History:   I have reviewed this patient's family history            Social History:   I have reviewed this patient's social history         Review of Systems:   The Review of Systems is negative other than noted in the HPI          Physical Exam:   /76   Pulse 79   Temp 97.6  F (36.4  C) (Temporal)   Resp 16   Ht 1.6 m (5' 3\")   Wt 92.5 kg (204 lb)   LMP 06/22/2023 (Exact Date)   BMI 36.14 kg/m    Gen - Pain with contractions  Neck - supple  CV - RRR  Resp - normal  Abd- gravid, NT  VE 2-3 on admit per triage nurse, Grossly ruptured  Ext - No CT, No edema    FHT - reassuring  [place] irregular contractions             Assessment:   Carol Chase is a 39w2d pregnant female admitted with SROM, +contractions. Changing cervix          Plan:   Admit - see IP orders  Pain mgmt as needed    Luis Fernando Virk MD    "

## 2024-03-24 NOTE — PLAN OF CARE
Data: Vital signs within normal limits. Postpartum checks within normal limits - see flow record. Patient eating and drinking normally. Patient able to empty bladder independently and is up ambulating. No apparent signs of infection. Laceration healing well. Patient performing self cares and is able to care for infant.  Action: Patient medicated during the shift for pain. See MAR. Patient reassessed within 1 hour after each medication and pain was improved - patient stated she was comfortable. Patient education done about self and  cares. See flow record.  Response: Positive attachment behaviors observed with infant. Support persons Kin and daughter present.   Plan: Anticipate discharge on 3/25.      Problem: Postpartum (Vaginal Delivery)  Goal: Successful Parent Role Transition  Outcome: Progressing  Goal: Absence of Infection Signs and Symptoms  Outcome: Progressing  Goal: Optimal Pain Control and Function  Outcome: Progressing  Intervention: Prevent or Manage Pain  Recent Flowsheet Documentation  Taken 3/23/2024 2059 by Arielle Gilman RN  Pain Management Interventions: medication (see MAR)  Taken 3/23/2024 1951 by Arielle Gilman RN  Pain Management Interventions: nitrous oxide           Arielle Gilman RN on 3/24/2024 at 6:27 AM

## 2024-03-24 NOTE — PROGRESS NOTES
over a 1 degree lac with repair, pt used Nitrous for pain relief, viable female delivered to mom's abdomen, minimal bleeding/clots, routine PP recovery.     Arielle Gilman RN on 3/23/2024 at 11:55 PM

## 2024-03-24 NOTE — PROCEDURES
Delivery note      Pt fully dilated and pushing delivered viable female. No nuchal cords. Body delivered, cord clamped and cut after 60 seconds. Cord blood drawn. Placenta delivered intact. Very small 1st degree lac repaired. Good hemostasis. Mom and baby stable.    NORMAN HICKMAN MD

## 2024-03-24 NOTE — PLAN OF CARE
Carol Chase is doing well after vaginal delivery. Fundus is firm with moderate bleeding and has passed 2 large clots since this morning. Pt educated on when to notify RN of more clots and bleeding. Fundus has remained firm and vitals are stable. Patient is breast feeding well and performing infant cares independently, desires breast pump at discharge. Patient has minimal amounts of tailbone/bottom pain that is well managed with oral analgesics. Patient is ambulating independently in room. She is voiding, spontaneously and without difficulty. Bowel sounds are active. Pt denies SI, see flowsheets for further information. Patient is Rh compatible with baby. Patient has verbalized understanding of routine cares and warning signs.     Goal: Absence of Hospital-Acquired Illness or Injury  Outcome: Progressing  Intervention: Prevent Skin Injury  Recent Flowsheet Documentation  Taken 3/24/2024 0745 by Astrid Orona RN  Body Position: position changed independently  Intervention: Prevent and Manage VTE (Venous Thromboembolism) Risk  Recent Flowsheet Documentation  Taken 3/24/2024 0745 by Astrid Orona RN  VTE Prevention/Management: (pt ambulating frequently) other (see comments)  Goal: Optimal Comfort and Wellbeing  Outcome: Progressing  Intervention: Monitor Pain and Promote Comfort  Recent Flowsheet Documentation  Taken 3/24/2024 0737 by Astrid Orona RN  Pain Management Interventions:   medication (see MAR)   repositioned  Intervention: Provide Person-Centered Care  Recent Flowsheet Documentation  Taken 3/24/2024 0745 by Astrid Orona RN  Trust Relationship/Rapport:   care explained   choices provided   emotional support provided   empathic listening provided   questions answered   questions encouraged   reassurance provided   thoughts/feelings acknowledged  Goal: Readiness for Transition of Care  Outcome: Progressing     Problem: Suicide Risk  Goal: Absence of Self-Harm  Outcome:  Progressing  Intervention: Assess Risk to Self and Maintain Safety  Recent Flowsheet Documentation  Taken 3/24/2024 0814 by Astrid Orona RN  Behavior Management: (not applicable, pt not demonstrating/verbalizing risk) other (see comments)  Self-Harm Prevention: (not applicable, pt not demonstrating/verbalizing risk) other (see comments)  Enhanced Safety Measures:   patient/family teach back on injury risk   room near unit station  Intervention: Promote Psychosocial Wellbeing  Recent Flowsheet Documentation  Taken 3/24/2024 0814 by Astrid Orona RN  Supportive Measures:   active listening utilized   counseling provided   decision-making supported   goal-setting facilitated   positive reinforcement provided   problem-solving facilitated   relaxation techniques promoted   self-care encouraged   self-responsibility promoted   verbalization of feelings encouraged  Sleep/Rest Enhancement:   comfort measures   relaxation techniques promoted   room darkened  Family/Support System Care:   involvement promoted   presence promoted   self-care encouraged   support provided  Taken 3/24/2024 0745 by Astrid Orona RN  Family/Support System Care: self-care encouraged  Intervention: Establish Safety Plan and Continuity of Care  Recent Flowsheet Documentation  Taken 3/24/2024 0814 by Astrid Orona RN  Safe Transition Promotion:   support system integrity appraised   resources access evaluated     Problem: Postpartum (Vaginal Delivery)  Goal: Successful Parent Role Transition  Outcome: Progressing  Intervention: Support Parent Role Transition  Recent Flowsheet Documentation  Taken 3/24/2024 0814 by Astrid Orona RN  Supportive Measures:   active listening utilized   counseling provided   decision-making supported   goal-setting facilitated   positive reinforcement provided   problem-solving facilitated   relaxation techniques promoted   self-care encouraged   self-responsibility promoted   verbalization of  feelings encouraged  Taken 3/24/2024 0745 by Astrid Orona RN  Parent-Child Attachment Promotion:   caring behavior modeled   cue recognition promoted   face-to-face positioning promoted   interaction encouraged   parent/caregiver presence encouraged   participation in care promoted   positive reinforcement provided   rooming-in promoted   skin-to-skin contact encouraged   strengths emphasized  Goal: Hemostasis  Outcome: Progressing  Goal: Absence of Infection Signs and Symptoms  Outcome: Progressing  Goal: Optimal Pain Control and Function  Outcome: Progressing  Intervention: Prevent or Manage Pain  Recent Flowsheet Documentation  Taken 3/24/2024 0737 by Astrid Orona RN  Pain Management Interventions:   medication (see MAR)   repositioned     Problem: Breastfeeding  Goal: Effective Breastfeeding  Outcome: Progressing  Intervention: Promote Effective Breastfeeding  Recent Flowsheet Documentation  Taken 3/24/2024 0745 by Astrid Orona RN  Parent-Child Attachment Promotion:   caring behavior modeled   cue recognition promoted   face-to-face positioning promoted   interaction encouraged   parent/caregiver presence encouraged   participation in care promoted   positive reinforcement provided   rooming-in promoted   skin-to-skin contact encouraged   strengths emphasized  Intervention: Support Exclusive Breastfeeding Success  Recent Flowsheet Documentation  Taken 3/24/2024 0814 by Astrid Orona, RN  Supportive Measures:   active listening utilized   counseling provided   decision-making supported   goal-setting facilitated   positive reinforcement provided   problem-solving facilitated   relaxation techniques promoted   self-care encouraged   self-responsibility promoted   verbalization of feelings encouraged

## 2024-03-24 NOTE — PROGRESS NOTES
"OB Progress Note  PPD # 1,     Pt doing well, limited bleeding      EXAM  /56 (BP Location: Right arm, Patient Position: Supine)   Pulse 78   Temp 97.3  F (36.3  C) (Temporal)   Resp 16   Ht 1.6 m (5' 3\")   Wt 92.5 kg (204 lb)   LMP 2023 (Exact Date)   SpO2 97%   Breastfeeding Unknown   BMI 36.14 kg/m    Gen - NAD  Abd- soft, non-tender  VE scant locia    Hemoglobin   Date Value Ref Range Status   2024 12.7 11.7 - 15.7 g/dL Final   2013 13.4 12.0 - 16.0 g/dL        A/P PPD # 1 s/p    Continue current care   Expect d/c tomorrow   Zoloft started for h/o depression    NORMAN HICKMAN MD    "

## 2024-03-25 ENCOUNTER — PATIENT OUTREACH (OUTPATIENT)
Dept: CARE COORDINATION | Facility: CLINIC | Age: 34
End: 2024-03-25
Payer: COMMERCIAL

## 2024-03-25 VITALS
OXYGEN SATURATION: 97 % | RESPIRATION RATE: 16 BRPM | BODY MASS INDEX: 35.75 KG/M2 | DIASTOLIC BLOOD PRESSURE: 73 MMHG | TEMPERATURE: 98.1 F | HEIGHT: 63 IN | SYSTOLIC BLOOD PRESSURE: 124 MMHG | HEART RATE: 78 BPM | WEIGHT: 201.8 LBS

## 2024-03-25 PROCEDURE — 250N000013 HC RX MED GY IP 250 OP 250 PS 637: Performed by: OBSTETRICS & GYNECOLOGY

## 2024-03-25 RX ADMIN — IBUPROFEN 800 MG: 400 TABLET, FILM COATED ORAL at 09:19

## 2024-03-25 RX ADMIN — SERTRALINE HYDROCHLORIDE 50 MG: 50 TABLET ORAL at 09:19

## 2024-03-25 RX ADMIN — ACETAMINOPHEN 650 MG: 325 TABLET, FILM COATED ORAL at 06:05

## 2024-03-25 RX ADMIN — DOCUSATE SODIUM 100 MG: 100 CAPSULE, LIQUID FILLED ORAL at 09:19

## 2024-03-25 RX ADMIN — IBUPROFEN 800 MG: 400 TABLET, FILM COATED ORAL at 00:03

## 2024-03-25 ASSESSMENT — ACTIVITIES OF DAILY LIVING (ADL)
ADLS_ACUITY_SCORE: 21

## 2024-03-25 NOTE — DISCHARGE SUMMARY
Hospital Discharge Summary    Carol Chase MRN# [id number]   Age: 33 year old YOB: 1990     Date of Admission:  3/23/2024  Date of Discharge::  3/25/2024  Admitting Physician:  Luis Fernando Virk MD  Discharge Physician:  Luis Fernando Virk MD     Home clinic: [facility]          Admission Diagnoses:   Encounter for triage in pregnant patient [Z36.89]  Pregnancy [Z34.90]          Discharge Diagnosis:     Normal spontaneous vaginal delivery  Intrauterine pregnancy at term          Procedures:     Procedure(s):        No other procedures performed during this admission           Medications Prior to Admission:     Medications Prior to Admission   Medication Sig Dispense Refill Last Dose    hydrocortisone (CORTAID) 1 % external cream Apply topically 2 times daily 20 g 0 More than a month    Magnesium Oxide 250 MG tablet TAKE 1 TABLET BY MOUTH EVERY DAY 90 tablet 1 Past Month    Minoxidil (ROGAINE MENS EXTRA STRENGTH) 5 % FOAM    More than a month    Prenatal 27-1 MG TABS Take 1 tablet by mouth daily 90 tablet 3 3/23/2024             Discharge Medications:     Current Discharge Medication List        CONTINUE these medications which have NOT CHANGED    Details   hydrocortisone (CORTAID) 1 % external cream Apply topically 2 times daily  Qty: 20 g, Refills: 0    Associated Diagnoses: External hemorrhoids      Magnesium Oxide 250 MG tablet TAKE 1 TABLET BY MOUTH EVERY DAY  Qty: 90 tablet, Refills: 1    Associated Diagnoses: Third trimester pregnancy      Minoxidil (ROGAINE MENS EXTRA STRENGTH) 5 % FOAM       Prenatal 27-1 MG TABS Take 1 tablet by mouth daily  Qty: 90 tablet, Refills: 3    Comments: Dispense product preferred by patient's insurance  Associated Diagnoses: Pregnancy examination or test, positive result                   Consultations:   No consultations were requested during this admission          Brief History of Labor:   See labor record           Hospital Course:   The patient's  hospital course was unremarkable.  On discharge, her pain was well controlled.  Vaginal bleeding is similar to peak menstrual flow.  Voiding without difficulty.  Ambulating well and tolerating a normal diet.  No fever.  Breastfeeding  well.  Infant is stable.  No bowel movement yet.*  She was discharged on post-partum day #2 .    Post-partum hemoglobin:   Hemoglobin   Date Value Ref Range Status   03/24/2024 12.7 11.7 - 15.7 g/dL Final   02/14/2013 13.4 12.0 - 16.0 g/dL              Discharge Instructions and Follow-Up:     Discharge diet: Regular   Discharge activity: Pelvic rest: abstain from intercourse and do not use tampons for 6 week(s)   Discharge follow-up: Follow up with primary care provider in 6 weeks   Wound care: Drink plenty of fluids  Ice to area for comfort  Keep wound clean and dry           Discharge Disposition:     Discharged to home      Attestation:  I have reviewed today's vital signs, notes, medications, labs and imaging.    Luis Fernando Virk MD

## 2024-03-25 NOTE — PLAN OF CARE
"Goal Outcome Evaluation:    Carol Chase is doing well. Vitals are stable: /73   Pulse 78   Temp 98.1  F (36.7  C) (Temporal)   Resp 16   Ht 1.6 m (5' 3\")   Wt 92.5 kg (204 lb)   LMP 2023 (Exact Date)   SpO2 97%   Breastfeeding Unknown   BMI 36.14 kg/m      FF, midline and 1 below. Bleeding is scant with no clots noted. Pain has been well managed with PRN oral analgesics. Voiding without difficulty. Patient IS passing gas. Independent in room. Tolerating a Regular diet and oral rehydration. breastfeeding independently. Bonding well with . Patient has verbalized understanding of routine cares and warning signs. Anticipated discharge home later today.      Plan of Care Reviewed With: patient  Overall Patient Progress: improving  Overall Patient Progress: improving  Outcome Evaluation: Bleeding scant. Pain well managed. Ready for discharge.    Rodolfo Hough RN 24 10:41 AM      "

## 2024-03-25 NOTE — PROGRESS NOTES
Called Dr. Bhat to place outpatient order for Zoloft 50 mg at [facility].    Rodolfo Hough RN 03/25/24 3:30 PM

## 2024-03-25 NOTE — PROGRESS NOTES
Clinic Care Coordination Contact    Received referral to visit patient as patient scored elevated on depression screening.     Writer spoke to nursing staff prior to approaching patient in her room. Patient has stated to them that she has had depression with past pregnancy during and after as well as with this pregnancy.  MD/Dr. Bhat aware and patient has already started medication as planned: Zoloft. Patient planning on setting baby up with follow up visit with pediatrician, Dr. Burr.     Patient, baby daughter and father of baby present in the room when writer initiated visit this afternoon.      Writer offered business card and discussed briefly the opportunity to follow up with Care Coordination.  She accepted offer and agreed that writer could text her on Care Coordination phone. (This has been completed.)  Patient agreed to reach back with any concerns or questions as her post partum progresses.     Writer will set up reminder to initiate a call to follow up with her status in next 2-3 weeks.     Requested and received Care Coordination referral order from MD and will Enroll patient today.     Plan: Enroll patient in Care Coordination to assess and offer assist with community resources/referrals; offer ongoing support, encouragement.     RIZWAN MORRISON on 3/25/2024 at 3:55 PM

## 2024-03-25 NOTE — LACTATION NOTE
This note was copied from a baby's chart.  INPATIENT LACTATION CONSULT      Consult with Carol and faby regarding breastfeeding.  Carol nursed her last child for 3 years with no problems. Carol states she notices obvious rooting with a strong latch during feeding sessions. Rhythmic and aggressive suckling also noted.  Instructed Carol on correct positioning and technique when latching babe on.  Carol is independent with latching babe onto breast.  Minimal assistance required.  Encouraged Carol on the importance of frequent feedings throughout the day (at least 8-12 feedings in a 24 hour period) and skin to skin contact.  Carol demonstrated and states she understands all information given.    Camilla Arthur RN, IBCLC  Lactation Consultant  [facility]

## 2024-03-25 NOTE — PLAN OF CARE
Data: Vital signs within normal limits. Postpartum checks within normal limits - see flow record. Patient eating and drinking normally. Patient able to empty bladder independently and is up ambulating. No apparent signs of infection.  Perineal laceration  healing well. Patient performing self cares and is able to care for infant.  Action: Patient medicated during the shift for pain. See MAR. Patient reassessed within 1 hour after each medication and pain was improved - patient stated she was comfortable. Patient education done about self and nb cares. See flow record.  Response: Positive attachment behaviors observed with infant. Support persons Kin present.   Plan: Anticipate discharge today.        Problem: Postpartum (Vaginal Delivery)  Goal: Optimal Pain Control and Function  Outcome: Progressing  Intervention: Prevent or Manage Pain  Recent Flowsheet Documentation  Taken 3/25/2024 0003 by Arielle Gilman RN  Pain Management Interventions:   medication (see MAR)   repositioned     Problem: Breastfeeding  Goal: Effective Breastfeeding  Outcome: Progressing  Intervention: Promote Breast Care and Comfort  Recent Flowsheet Documentation  Taken 3/24/2024 2324 by Arielle Gilman RN  Breast Care: Breastfeeding:   Hydrogel dressing applied   lanolin to nipples  Intervention: Promote Effective Breastfeeding  Recent Flowsheet Documentation  Taken 3/24/2024 2324 by Arielle Gilman RN  Parent-Child Attachment Promotion:   caring behavior modeled   cue recognition promoted   face-to-face positioning promoted   interaction encouraged   parent/caregiver presence encouraged   participation in care promoted   positive reinforcement provided   rooming-in promoted   skin-to-skin contact encouraged   strengths emphasized  Intervention: Support Exclusive Breastfeeding Success  Recent Flowsheet Documentation  Taken 3/24/2024 2324 by Arielle Gilman RN  Supportive Measures:   positive reinforcement provided   self-care encouraged     Problem:  Postpartum (Vaginal Delivery)  Goal: Successful Parent Role Transition  Outcome: Met  Intervention: Support Parent Role Transition  Recent Flowsheet Documentation  Taken 3/24/2024 2324 by Arielle Gilman RN  Supportive Measures:   positive reinforcement provided   self-care encouraged  Parent-Child Attachment Promotion:   caring behavior modeled   cue recognition promoted   face-to-face positioning promoted   interaction encouraged   parent/caregiver presence encouraged   participation in care promoted   positive reinforcement provided   rooming-in promoted   skin-to-skin contact encouraged   strengths emphasized     Problem: Suicide Risk  Goal: Absence of Self-Harm  Outcome: Adequate for Care Transition  Intervention: Promote Psychosocial Wellbeing  Recent Flowsheet Documentation  Taken 3/24/2024 2324 by Arielle Gilman, RN  Supportive Measures:   positive reinforcement provided   self-care encouraged  Family/Support System Care:   involvement promoted   self-care encouraged   support provided

## 2024-03-25 NOTE — DISCHARGE INSTRUCTIONS
Warning Signs after Having a Baby    Keep this paper on your fridge or somewhere else where you can see it.    Call your provider if you have any of these symptoms up to 12 weeks after having your baby.    Thoughts of hurting yourself or your baby  Pain in your chest or trouble breathing  Severe headache not helped by pain medicine  Eyesight concerns (blurry vision, seeing spots or flashes of light, other changes to eyesight)  Fainting, shaking or other signs of a seizure    Call 9-1-1 if you feel that it is an emergency.     The symptoms below can happen to anyone after giving birth. They can be very serious. Call your provider if you have any of these warning signs.    My provider s phone number: _______________________    Losing too much blood (hemorrhage)    Call your provider if you soak through a pad in less than an hour or pass blood clots bigger than a golf ball. These may be signs that you are bleeding too much.    Blood clots in the legs or lungs    After you give birth, your body naturally clots its blood to help prevent blood loss. Sometimes this increased clotting can happen in other areas of the body, like the legs or lungs. This can block your blood flow and be very dangerous.     Call your provider if you:  Have a red, swollen spot on the back of your leg that is warm or painful when you touch it.   Are coughing up blood.     Infection    Call your provider if you have any of these symptoms:  Fever of 100.4 F (38 C) or higher.  Pain or redness around your stitches if you had an incision.   Any yellow, white, or green fluid coming from places where you had stitches or surgery.    Mood Problems (postpartum depression)    Many people feel sad or have mood changes after having a baby. But for some people, these mood swings are worse.     Call your provider right away if you feel so anxious or nervous that you can't care for yourself or your baby.    Preeclampsia (high blood pressure)    Even if you  didn't have high blood pressure when you were pregnant, you are at risk for the high blood pressure disease called preeclampsia. This risk can last up to 12 weeks after giving birth.     Call your provider if you have:   Pain on your right side under your rib cage  Sudden swelling in the hands and face    Remember: You know your body. If something doesn't feel right, get medical help.     For informational purposes only. Not to replace the advice of your health care provider. Copyright 2020 [facility]. All rights reserved. Clinically reviewed by Laura Brooks, RNC-OB, MSN. [employer] 336696 - Rev 02/23.

## 2024-03-25 NOTE — PROGRESS NOTES
:    Updated clinic  Mandi Reynolds about this referral.     Edwin Pierce, KATYA on 3/25/2024 at 2:01 PM

## 2024-03-25 NOTE — PROGRESS NOTES
Discharge Note      Data:  Carol Chase discharged to home at 1540 via ambulation. Accompanied by significant other and staff.    Action:  Written discharge/follow-up instructions were provided to patient and spouse. Prescriptions sent to patients preferred pharmacy. All belongings sent with patient.    Response:  Carol verbalized understanding of discharge instructions, reason for discharge, and necessary follow-up appointments. Without further questions or concerns at this time. Verified bands.    Rodolfo Hough RN 03/25/24 3:41 PM

## 2024-03-25 NOTE — PROGRESS NOTES
Chart reviewed per MST screen: unsure about wt loss. Noted wt gain since Jan this year. Tolerating her diet, planning to discharge home today.    Wt Readings from Last 10 Encounters:   03/23/24 92.5 kg (204 lb)   03/19/24 92.5 kg (204 lb)   03/11/24 92 kg (202 lb 14.4 oz)   03/05/24 92.2 kg (203 lb 4.8 oz)   02/26/24 92.9 kg (204 lb 12.8 oz)   02/20/24 92.7 kg (204 lb 6.4 oz)   02/07/24 92.5 kg (203 lb 14.4 oz)   01/25/24 91.2 kg (201 lb)   01/06/24 83.5 kg (184 lb)   12/02/21 83.6 kg (184 lb 6.4 oz)    Serene Almanza RD on 3/25/2024 at 8:40 AM

## 2024-03-25 NOTE — PROGRESS NOTES
"OB Progress note    Pt doing well, ready for discharge     EXAM  /64   Pulse 78   Temp 97.3  F (36.3  C)   Resp 16   Ht 1.6 m (5' 3\")   Wt 92.5 kg (204 lb)   LMP 2023 (Exact Date)   SpO2 98%   Breastfeeding Unknown   BMI 36.14 kg/m    Gen - NAD  Abd- soft, non-tender  VE - scant locia    A/P PPD # 2 s/p    Discharge home   Follow up 6 weeks    NORMAN HICKMAN MD    "

## 2024-03-27 ENCOUNTER — HOSPITAL ENCOUNTER (OUTPATIENT)
Dept: OBGYN | Facility: OTHER | Age: 34
Discharge: HOME | End: 2024-03-27
Attending: STUDENT IN AN ORGANIZED HEALTH CARE EDUCATION/TRAINING PROGRAM
Payer: COMMERCIAL

## 2024-03-27 ENCOUNTER — LACTATION ENCOUNTER (OUTPATIENT)
Dept: OBGYN | Facility: OTHER | Age: 34
End: 2024-03-27

## 2024-03-27 PROCEDURE — S9443 LACTATION CLASS: HCPCS | Performed by: REGISTERED NURSE

## 2024-03-27 NOTE — LACTATION NOTE
This note was copied from a baby's chart.  Outpatient Lactation Visit    Jessica Jo  [id number]    Consultation Date: 2024     Reason for Lactation Referral: Initial Lactation Consult    Baby's : 3/23/2024    Baby's Current Age: 4 day old  Baby's Gestational Age: Gestational Age: 39w2d    Primary Care Provider: No primary care provider on file.    Presenting Problem (concerns as stated by parent): no concerns    MATERNAL HISTORY   History of Breast Surgery: no  Breast Changes During Pregnancy: no  Breast Feeding History: nursed last child for 1 year  Maternal Meds: daily prenatal vitamin  Pregnancy Complications: none  Anesthesia during labor: nitrous    MATERNAL ASSESSMENT    Breast Size: average, symmetrical, soft after feeding and filling prior to feeding  Nipple Appearance - Left: cracked, with signs of healing, education on further healing techniques provided  Nipple Appearance - Right: cracked, with signs of healing, education on further healing techniques provided  Nipple Erectility - Left: erect with stimulation  Nipple Erectility - Right: erect with stimulation  Areolas Compressibility: soft  Nipple Size: average  Special Equipment Used: 24 mm shield (given today)  Day mother reports milk came in:  Day 3    INFANT ASSESSMENT    Oral Anatomy  Mouth: normal  Palate: normal  Jaw: normal  Tongue: normal  Frenulum: normal   Digital Suck Exam: root    FEEDING   Feeding Time: aggressively for 20 minutes  Position:  football  Effort to Latch: awake and alert, latched easily  Duration of Breast Feeding: Right Breast: 0; Left Breast: 20  Results: excellent breast feed    Volume of Intake:  Birth Weight: 8 lb 8.6 oz  Hospital discharge weight: 8 lb 1.9 oz  Today's Weight 8 lb 3.4 oz  Total Intake: 1.3 oz  Output: 3-4 soil diapers in last 24 hours, 3-4 wet diapers in last 24 hours    LATCH Score:   Latch: 2 - Good Latch  Audible Swallowin - Spontaneous & frequent  Type of Nipple: (Breast/Nipple)  2 - Everted  Comfort: 2 - Soft, Nontender  Hold: 2 - No Assist   Total LATCH Score:  10    FEEDING PLAN    Home Feeding Plan: Continue to feed on demand when  elicits feeding cues with deep latch.  Babe should be eating 8-12 times in a 24 hour period.  Exclusivity explained and encouraged in the early weeks to establish breastfeeding and order in milk supply.  Rooming-in encouraged with explanation of the benefits.  Continue to apply expressed breast milk and Lanolin cream to nipples after feedings for healing and comfort.  Postpartum breastfeeding assessment completed and education provided.  Items included in the education are:   proper positioning and latch  effectiveness of feeding  manual expression  handling and storing breastmilk  maintenance of breastfeeding for the first 6 months  sign/symptoms of infant feeding issues requiring referral to qualified health care provider    LACTATION COMMENTS   Deep latch explained for proper positioning of breast in infant's mouth, maximizing milk transfer and comfort.  Reassurance and encouragement provided in regard to mom's concerns about milk supply.  Follow-up support information provided.  Parents plan to keep  Well-Child Check with Dr. Johnson as scheduled for 2 week well child check.      Face-to-face Time: 60 minutes with assessment and education.    Camilla Arthur, RN  3/27/2024  10:43 AM

## 2024-04-05 ENCOUNTER — TELEPHONE (OUTPATIENT)
Dept: OBGYN | Facility: OTHER | Age: 34
End: 2024-04-05
Payer: COMMERCIAL

## 2024-04-05 NOTE — TELEPHONE ENCOUNTER
This writer contacted patient and she states that she is having some very painful hemorrhoids. Patient has tried epsom salt baths, tucks pads with no relief. Patient would like to know what else she should do or if she needs to come into clinic. Lacey Lino LPN on 4/5/2024 at 10:46 AM

## 2024-04-05 NOTE — TELEPHONE ENCOUNTER
Patient was notified and instructed to call back next week if there is no relief. Lacey Lino LPN on 4/5/2024 at 3:59 PM

## 2024-04-05 NOTE — TELEPHONE ENCOUNTER
Patient is having some postpartum issues and would like to talk with a nurse to see if she needs to be seen.  Claudine Hernández on 4/5/2024 at 9:48 AM

## 2024-04-05 NOTE — TELEPHONE ENCOUNTER
hemorrhoids   o Anusol-HC  hemorrhoidal ointment or Preparation H  Hydrocortisone (hydrocortisone)  o Preparation H  (phenylephrine, pramoxine, glycerin, petrolatum)  o Tucks  pads (witch hazel)  o Milk of Magnesia and ice, blend into a slushy, place on a pad or washcloth and apply to affected area.

## 2024-04-08 ENCOUNTER — TELEPHONE (OUTPATIENT)
Dept: OBGYN | Facility: OTHER | Age: 34
End: 2024-04-08
Payer: COMMERCIAL

## 2024-04-08 ENCOUNTER — MEDICAL CORRESPONDENCE (OUTPATIENT)
Dept: HEALTH INFORMATION MANAGEMENT | Facility: OTHER | Age: 34
End: 2024-04-08
Payer: COMMERCIAL

## 2024-04-08 NOTE — TELEPHONE ENCOUNTER
Patient called and left message with scheduling stating the her hemorrhoids have gotten worse over the weekend.     Janice Cerda RN on 4/8/2024 at 8:47 AM

## 2024-04-08 NOTE — TELEPHONE ENCOUNTER
"Per Dr. Mary Bhat     \"Can you please have her connect with general surgery if the over the counter stuff is not helping. I apologize but I do not know how to do any of the banding procedures to take them off.    Thanks!  Mary\"        General Surgery Referral placed per Dr. Mary Bhat.     Called and spoke to Patient after verifying last name and date of birth. Patient informed that a referral had been placed to general surgery and they should be calling with in the next couple of days. Patient verbalized understanding and had no questions at this time.       Janice Cerda RN on 4/8/2024 at 1:45 PM             "

## 2024-04-08 NOTE — TELEPHONE ENCOUNTER
Patient called and reported that she has 5 hemorrhoids. Reports that the pain from them is between a 7-9. Patient reports that the over the counter remedies have not been helping. She had used MOM and ice, Tucks pads, and Preparation H. She had also used a numbing spray with lidocaine in it. Patient is us sure if they are bleeding due to still bleeding form birth and can not tell where the blood is from. Patient said the pain from the hemorrhoids is interfering with her ability to take care of her daughter. Patient is wondering what could be done? Patent reports that she is coming into [facility] now due to her daughter having an appointment. Patient informed that message would be sent to Dr. Mary Bhat fore review. Patient verbalized understanding and had no questions at this time.         Janice Cerda RN on 4/8/2024 at 10:50 AM

## 2024-04-09 ENCOUNTER — OFFICE VISIT (OUTPATIENT)
Dept: SURGERY | Facility: OTHER | Age: 34
End: 2024-04-09
Attending: SURGERY
Payer: COMMERCIAL

## 2024-04-09 VITALS
RESPIRATION RATE: 18 BRPM | TEMPERATURE: 98.9 F | SYSTOLIC BLOOD PRESSURE: 128 MMHG | HEART RATE: 82 BPM | DIASTOLIC BLOOD PRESSURE: 72 MMHG | BODY MASS INDEX: 32.35 KG/M2 | OXYGEN SATURATION: 98 % | WEIGHT: 182.6 LBS

## 2024-04-09 DIAGNOSIS — K64.4 EXTERNAL HEMORRHOIDS WITH COMPLICATION: Primary | ICD-10-CM

## 2024-04-09 PROCEDURE — 99203 OFFICE O/P NEW LOW 30 MIN: CPT | Performed by: SURGERY

## 2024-04-09 RX ORDER — DIBUCAINE 1 G/100G
OINTMENT TOPICAL 4 TIMES DAILY PRN
Qty: 60 G | Refills: 2 | Status: SHIPPED | OUTPATIENT
Start: 2024-04-09

## 2024-04-09 ASSESSMENT — PAIN SCALES - GENERAL: PAINLEVEL: SEVERE PAIN (7)

## 2024-04-09 NOTE — NURSING NOTE
"Chief Complaint   Patient presents with    Procedure     Hemorroids           Medication reconciliation completed.    FOOD SECURITY SCREENING QUESTIONS:    The next two questions are to help us understand your food security.  If you are feeling you need any assistance in this area, we have resources available to support you today.    Hunger Vital Signs:  Within the past 12 months we worried whether our food would run out before we got money to buy more. Never  Within the past 12 months the food we bought just didn't last and we didn't have money to get more. Never    Initial /72 (BP Location: Right arm, Patient Position: Sitting, Cuff Size: Adult Regular)   Pulse 82   Temp 98.9  F (37.2  C) (Temporal)   Resp 18   Wt 82.8 kg (182 lb 9.6 oz)   LMP 06/22/2023 (Exact Date)   SpO2 98%   BMI 32.35 kg/m   Estimated body mass index is 32.35 kg/m  as calculated from the following:    Height as of 3/23/24: 1.6 m (5' 3\").    Weight as of this encounter: 82.8 kg (182 lb 9.6 oz).       Karis Flynn LPN .......  4/9/2024  2:21 PM    "

## 2024-04-09 NOTE — PROGRESS NOTES
GENERAL SURGERY CONSULTATION NOTE    Carol Chase   [address] [facility] [place] [address]  33 year old  female  Admission Date/Time: No admission date for patient encounter.  Primary Care Provider:  No Ref-Primary, Physician    I was asked to see this patient by Dr. Mary Bhat for hemorrhoidal management    HPI: Carol is a 33-year-old female with a history of hemorrhoids with each pregnancy.  Previously resolved spontaneously postpartum.  Currently she is dealing with pain and external swelling.  She has not noticed any bleeding.  She believes that they might be pushing out with each bowel movement.  She finds it difficult to sit for a period of time.  She is currently 2 weeks postpartum.  She has been trying topical hemorrhoidal ointments with no relief.    REVIEW OF SYSTEMS:    GENERAL: No fevers or chills. Denies fatigue, recent weight loss.  HEENT: No sinus drainage. No changes with vision or hearing. No difficulty swallowing.   LYMPHATICS:  Noswollen nodes in axilla, neck or groin.  CARDIOVASCULAR: Denies chest pain, palpitations and dyspnea on exertion.  PULMONARY: No shortness of breath or cough. No increase in sputum production.  GI: Denies melena,bright red blood in stools. No hematemesis. No constipation or diarrhea.  : No dysuria or hematuria.  SKIN: No recent rashes or ulcers.   HEMATOLOGY:  No history of easy bruising or bleeding.  ENDOCRINE:  Nohistory of diabetes or thyroid problems.  NEUROLOGY:  No history of seizures or headaches. No motor or sensory changes.    Patient Active Problem List   Diagnosis    Cervical dysplasia complicating pregnancy    Hemorrhoids, external without complications    Keratoconjunctivitis of left eye    Left ear pain    Myopia with astigmatism    Overweight (BMI 25.0-29.9)    Spondylolisthesis of lumbar region    Spontaneous     Viral upper respiratory tract infection    White without pressure of peripheral retina of both eyes    Major depressive disorder,  recurrent episode, mild (H24)    Encounter for triage in pregnant patient    Pregnancy        Past Medical History:   Diagnosis Date    Depressive disorder     Postpartum depression        Past Surgical History:   Procedure Laterality Date    APPENDECTOMY      CHOLECYSTECTOMY          No family history on file.     Social History     Socioeconomic History    Marital status: Single     Spouse name: Not on file    Number of children: Not on file    Years of education: Not on file    Highest education level: Not on file   Occupational History    Not on file   Tobacco Use    Smoking status: Former     Types: Cigarettes    Smokeless tobacco: Never   Vaping Use    Vaping Use: Never used   Substance and Sexual Activity    Alcohol use: No    Drug use: Not Currently     Types: Other     Comment: Drug use: No    Sexual activity: Yes     Partners: Male   Other Topics Concern    Not on file   Social History Narrative    Not on file     Social Determinants of Health     Financial Resource Strain: Not on file   Food Insecurity: Not on file   Transportation Needs: Not on file   Physical Activity: Not on file   Stress: Not on file   Social Connections: Not on file   Interpersonal Safety: Low Risk  (4/9/2024)    Interpersonal Safety     Do you feel physically and emotionally safe where you currently live?: Yes     Within the past 12 months, have you been hit, slapped, kicked or otherwise physically hurt by someone?: No     Within the past 12 months, have you been humiliated or emotionally abused in other ways by your partner or ex-partner?: No   Housing Stability: Not on file         Current Outpatient Medications   Medication Sig Dispense Refill    hydrocortisone (CORTAID) 1 % external cream Apply topically 2 times daily 20 g 0    Magnesium Oxide 250 MG tablet TAKE 1 TABLET BY MOUTH EVERY DAY 90 tablet 1    Prenatal 27-1 MG TABS Take 1 tablet by mouth daily 90 tablet 3    sertraline (ZOLOFT) 50 MG tablet Take 1 tablet (50 mg) by  mouth daily 90 tablet 2    Minoxidil (ROGAINE MENS EXTRA STRENGTH) 5 % FOAM  (Patient not taking: Reported on 4/9/2024)       No current facility-administered medications for this visit.         ALLERGIES/SENSITIVITIES:   Allergies   Allergen Reactions    Amoxicillin-Pot Clavulanate Itching and Rash     Pt reports widespread itching within 4 hours of 1st dose. Also flat pink rash.   Pt reports widespread itching within 4 hours of 1st dose. Also flat pink rash.          PHYSICAL EXAM:     /72 (BP Location: Right arm, Patient Position: Sitting, Cuff Size: Adult Regular)   Pulse 82   Temp 98.9  F (37.2  C) (Temporal)   Resp 18   Wt 82.8 kg (182 lb 9.6 oz)   LMP 06/22/2023 (Exact Date)   SpO2 98%   BMI 32.35 kg/m      General Appearance: Appears uncomfortable  Heart & CV:  RRR no murmur.  Intact distal pulses, good cap refill.  LUNGS:  CTA B/L, no wheezing or crackles.  Abd: Obese but nondistended  Ext: No deformity  Rectal: External examination shows circumferential external hemorrhoids with no thrombosis but moderate dilation.      ADDITIONAL COMMENTS:      CONSULTATION ASSESSMENT AND PLAN:    33 year old female with external hemorrhoids postpartum.  Discussed changes in bowel habits.  Offered surgical hemorrhoidectomy versus conservative management.    In the interim until she makes a decision we can try some Nupercainal ointment.  It should be safe with breast-feeding.    If we did a surgical procedure to be external hemorrhoidectomy during exam under anesthesia.    James Gray MD on 4/9/2024 at 2:46 PM

## 2024-04-17 ENCOUNTER — TELEPHONE (OUTPATIENT)
Dept: OBGYN | Facility: OTHER | Age: 34
End: 2024-04-17
Payer: COMMERCIAL

## 2024-04-17 NOTE — TELEPHONE ENCOUNTER
Patient requests the short term disability paperwork be re- faxed to [place] with the claim number provided below.    Fax ([employer]): [phone]    Claim number: [id number]    Okay to leave detailed message.        Sondra Campos on 4/17/2024 at 4:24 PM

## 2024-04-18 NOTE — TELEPHONE ENCOUNTER
Paperwork re faxed with the number added to paperwork.     Janice Cerda RN on 4/18/2024 at 10:34 AM

## 2024-05-06 ENCOUNTER — PRENATAL OFFICE VISIT (OUTPATIENT)
Dept: OBGYN | Facility: OTHER | Age: 34
End: 2024-05-06
Attending: STUDENT IN AN ORGANIZED HEALTH CARE EDUCATION/TRAINING PROGRAM
Payer: COMMERCIAL

## 2024-05-06 ENCOUNTER — PATIENT OUTREACH (OUTPATIENT)
Dept: CARE COORDINATION | Facility: CLINIC | Age: 34
End: 2024-05-06
Payer: COMMERCIAL

## 2024-05-06 VITALS
HEART RATE: 84 BPM | WEIGHT: 180.8 LBS | SYSTOLIC BLOOD PRESSURE: 118 MMHG | BODY MASS INDEX: 32.03 KG/M2 | DIASTOLIC BLOOD PRESSURE: 72 MMHG

## 2024-05-06 DIAGNOSIS — F41.1 GENERALIZED ANXIETY DISORDER: Primary | ICD-10-CM

## 2024-05-06 PROCEDURE — 99207 PR POST-PARTUM 6 WK VISIT - GICH ONLY: CPT | Performed by: STUDENT IN AN ORGANIZED HEALTH CARE EDUCATION/TRAINING PROGRAM

## 2024-05-06 RX ORDER — SERTRALINE HYDROCHLORIDE 100 MG/1
100 TABLET, FILM COATED ORAL DAILY
Qty: 90 TABLET | Refills: 4 | Status: SHIPPED | OUTPATIENT
Start: 2024-05-06

## 2024-05-06 RX ORDER — ACETAMINOPHEN AND CODEINE PHOSPHATE 120; 12 MG/5ML; MG/5ML
0.35 SOLUTION ORAL DAILY
Qty: 84 TABLET | Refills: 4 | Status: SHIPPED | OUTPATIENT
Start: 2024-05-06

## 2024-05-06 RX ORDER — GABAPENTIN 300 MG/1
300 CAPSULE ORAL 2 TIMES DAILY
COMMUNITY
Start: 2023-07-26

## 2024-05-06 ASSESSMENT — ANXIETY QUESTIONNAIRES
6. BECOMING EASILY ANNOYED OR IRRITABLE: MORE THAN HALF THE DAYS
1. FEELING NERVOUS, ANXIOUS, OR ON EDGE: SEVERAL DAYS
8. IF YOU CHECKED OFF ANY PROBLEMS, HOW DIFFICULT HAVE THESE MADE IT FOR YOU TO DO YOUR WORK, TAKE CARE OF THINGS AT HOME, OR GET ALONG WITH OTHER PEOPLE?: SOMEWHAT DIFFICULT
3. WORRYING TOO MUCH ABOUT DIFFERENT THINGS: SEVERAL DAYS
GAD7 TOTAL SCORE: 7
2. NOT BEING ABLE TO STOP OR CONTROL WORRYING: NOT AT ALL
7. FEELING AFRAID AS IF SOMETHING AWFUL MIGHT HAPPEN: SEVERAL DAYS
7. FEELING AFRAID AS IF SOMETHING AWFUL MIGHT HAPPEN: SEVERAL DAYS
4. TROUBLE RELAXING: MORE THAN HALF THE DAYS
GAD7 TOTAL SCORE: 7
5. BEING SO RESTLESS THAT IT IS HARD TO SIT STILL: NOT AT ALL
IF YOU CHECKED OFF ANY PROBLEMS ON THIS QUESTIONNAIRE, HOW DIFFICULT HAVE THESE PROBLEMS MADE IT FOR YOU TO DO YOUR WORK, TAKE CARE OF THINGS AT HOME, OR GET ALONG WITH OTHER PEOPLE: SOMEWHAT DIFFICULT
GAD7 TOTAL SCORE: 7

## 2024-05-06 ASSESSMENT — EDINBURGH POSTNATAL DEPRESSION SCALE (EPDS)
THE THOUGHT OF HARMING MYSELF HAS OCCURRED TO ME: NEVER
I HAVE BEEN ANXIOUS OR WORRIED FOR NO GOOD REASON: YES, SOMETIMES
I HAVE BLAMED MYSELF UNNECESSARILY WHEN THINGS WENT WRONG: NOT VERY OFTEN
I HAVE FELT SAD OR MISERABLE: NO, NOT AT ALL
I HAVE LOOKED FORWARD WITH ENJOYMENT TO THINGS: RATHER LESS THAN I USED TO
TOTAL SCORE: 7
I HAVE BEEN SO UNHAPPY THAT I HAVE HAD DIFFICULTY SLEEPING: NOT AT ALL
I HAVE FELT SAD OR MISERABLE: NO, NOT AT ALL
THE THOUGHT OF HARMING MYSELF HAS OCCURRED TO ME: NEVER
I HAVE BEEN SO UNHAPPY THAT I HAVE HAD DIFFICULTY SLEEPING: NOT AT ALL
I HAVE BLAMED MYSELF UNNECESSARILY WHEN THINGS WENT WRONG: NOT VERY OFTEN
I HAVE FELT SCARED OR PANICKY FOR NO GOOD REASON: NO, NOT AT ALL
I HAVE BEEN ANXIOUS OR WORRIED FOR NO GOOD REASON: YES, SOMETIMES
I HAVE BEEN ABLE TO LAUGH AND SEE THE FUNNY SIDE OF THINGS: AS MUCH AS I ALWAYS COULD
I HAVE LOOKED FORWARD WITH ENJOYMENT TO THINGS: RATHER LESS THAN I USED TO
I HAVE BEEN SO UNHAPPY THAT I HAVE BEEN CRYING: ONLY OCCASIONALLY
I HAVE BEEN SO UNHAPPY THAT I HAVE BEEN CRYING: ONLY OCCASIONALLY
I HAVE BEEN ABLE TO LAUGH AND SEE THE FUNNY SIDE OF THINGS: AS MUCH AS I ALWAYS COULD
THINGS HAVE BEEN GETTING ON TOP OF ME: YES, SOMETIMES I HAVEN'T BEEN COPING AS WELL AS USUAL
THINGS HAVE BEEN GETTING ON TOP OF ME: YES, SOMETIMES I HAVEN'T BEEN COPING AS WELL AS USUAL
I HAVE FELT SCARED OR PANICKY FOR NO GOOD REASON: NO, NOT AT ALL

## 2024-05-06 ASSESSMENT — PATIENT HEALTH QUESTIONNAIRE - PHQ9
SUM OF ALL RESPONSES TO PHQ QUESTIONS 1-9: 9
10. IF YOU CHECKED OFF ANY PROBLEMS, HOW DIFFICULT HAVE THESE PROBLEMS MADE IT FOR YOU TO DO YOUR WORK, TAKE CARE OF THINGS AT HOME, OR GET ALONG WITH OTHER PEOPLE: SOMEWHAT DIFFICULT
SUM OF ALL RESPONSES TO PHQ QUESTIONS 1-9: 9

## 2024-05-06 NOTE — PROGRESS NOTES
Postpartum Office Visit    S: Ms. Chase is a  who is s/p an  on 3/23/2024. Her delivery was uncomplicated. Her postpartum course in the hospital was also uncomplicated. She is feeling well today. She has no acute concerns. She notes her mood has been stable, she is currently using zoloft. She may want to increase the dose a little. She has been breastfeeding the baby with one supplementation each day. She has not yet been sexually active. Desires POPs for contraception.     O: /72   Pulse 84   Wt 82 kg (180 lb 12.8 oz)   LMP 2023 (Exact Date)   Breastfeeding Yes   BMI 32.03 kg/m    Gen: Well-appearing, NAD  Pelvic: deferred    Assessment:  Ms. Carol Chase is a 33 year old yo now  who is s/p  on 3/23/2024. She is doing well in the postpartum period.    Plan:  # Routine postpartum care  -- Feeling well, no concerns  -- breastfeeding  -- Contraception goals: POPs  -- Mood stable and doing well  -- Pap due     Return to clinic with any questions or concerns    Mary Bhat MD  OB/GYN  2024 2:48 PM      Answers submitted by the patient for this visit:  Patient Health Questionnaire (Submitted on 2024)  If you checked off any problems, how difficult have these problems made it for you to do your work, take care of things at home, or get along with other people?: Somewhat difficult  PHQ9 TOTAL SCORE: 9  ALMAZ-7 (Submitted on 2024)  ALMAZ 7 TOTAL SCORE: 7

## 2024-05-06 NOTE — PROGRESS NOTES
Clinic Care Coordination Contact    Initiated phone contact with patient today to check in with patient and discuss patient's post partum status overall.      Writer inquired re: her current physical and mental health status.  Patient reports continuing her dose of Zoloft. Writer initiated discussion of patient option of seeing Davina Mejia/NP for ongoing medication management. Patient agreed that that might be a good option for her Dx of Major Depressive disorder, recurrent/mild episode.  Writer gave her the phone number to call and set up an appointment and to be prepared it may be a wait to see provider.     Patient shared that she has relief with over the counter products in re: her hemorrhoid management and does not feel that she will need surgery at this time.     Patient continues to breast feed her daughter and expresses concern re: making enough milk during lactation.  Writer encouraged her to discuss at her appointment with OB/GYN Dr. Bhat.     Patient stated that her two older children --both children around 10-12 years old--are enjoying the new baby and helpful to her. No concerns with sybling adjustment noted.     Also asked her re: [facility] referral and she said she is partaking of this resource.  She did state she is [place] and does feel confident utilizing [facility] resources.     Writer texted patient again and encouraged her to text back if she needs any support or follow up with resources/referrals.  Also texted her Maternal Crisis Line and encouraged her to use resource if needed.     Plan: Ongoing Care Coordination to assess and offer assist with Community resources/referrals; ongoing encouragement/support as needed.     Follow up with patient in next several weeks to check on her status.     RIZWAN MORRISON on 5/6/2024 at 12:01 PM

## 2024-05-06 NOTE — NURSING NOTE
Pt presents to clinic today for 6 week post partum care      Medication Reconciliation: complete  Aster Gray LPN

## 2024-07-05 ENCOUNTER — MEDICAL CORRESPONDENCE (OUTPATIENT)
Dept: HEALTH INFORMATION MANAGEMENT | Facility: OTHER | Age: 34
End: 2024-07-05
Payer: COMMERCIAL

## 2025-03-09 ENCOUNTER — HEALTH MAINTENANCE LETTER (OUTPATIENT)
Age: 35
End: 2025-03-09

## 2025-07-07 DIAGNOSIS — F41.1 GENERALIZED ANXIETY DISORDER: ICD-10-CM

## 2025-07-07 RX ORDER — SERTRALINE HYDROCHLORIDE 100 MG/1
100 TABLET, FILM COATED ORAL DAILY
Qty: 90 TABLET | Refills: 4 | OUTPATIENT
Start: 2025-07-07

## 2025-07-07 NOTE — TELEPHONE ENCOUNTER
Rx request for the following:      Requested Prescriptions   Pending Prescriptions Disp Refills    sertraline (ZOLOFT) 100 MG tablet [Pharmacy Med Name: SERTRALINE  MG TABLET] 90 tablet 4     Sig: TAKE 1 TABLET BY MOUTH EVERY DAY       SSRIs Protocol Failed - 7/7/2025 11:38 AM        Failed - ALMAZ-7 score of less than 5 in past 6 months.     Please review last ALMAZ-7 score.       2/20/2024     8:13 AM 5/6/2024     1:45 PM   ALMAZ-7 SCORE   Total Score 14 (moderate anxiety) 7 (mild anxiety)   Total Score 14 7             Failed - Recent (12 month) or future (90 days) visit with authorizing provider's specialty (provided they have been seen in the past 15 months)     The patient must have completed an in-person or virtual visit within the past 12 months or has a future visit scheduled within the next 90 days with the authorizing provider s specialty.  Urgent care and e-visits do not qualify as an office visit for this protocol.          Passed - Medication is active on med list and the sig matches. RN to manually verify dose and sig if red X/fail.     If the protocol passes (green check), you do not need to verify med dose and sig.    A prescription matches if they are the same clinical intention.    For Example: once daily and every morning are the same.    The protocol can not identify upper and lower case letters as matching and will fail.     For Example: Take 1 tablet (50 mg) by mouth daily     TAKE 1 TABLET (50 MG) BY MOUTH DAILY    For all fails (red x), verify dose and sig.    If the refill does match what is on file, the RN can still proceed to approve the refill request.       If they do not match, route to the appropriate provider.             Passed - Medication indicated for associated diagnosis     Medication is associated with one or more of the following diagnoses:              Anxiety             Bipolar  Depression  Obsessive-compulsive disorder             Panic disorder  Postmenopausal  flushing             Premenstrual dysphoric disorder             Social phobia   Adjustment disorder with depressed mood   Mood disorder   Adjustment disorder with anxious mood          Passed - Patient is age 18 or older        Passed - No active pregnancy on record        Passed - No positive pregnancy test in last 12 months             Last Prescription Date:   5/6/24  Last Fill Qty/Refills:         90, R-4  Last Office Visit:              5/6/2024   Future Office visit:           none     Aster Gray RN

## (undated) RX ORDER — IBUPROFEN 400 MG/1
TABLET, FILM COATED ORAL
Status: DISPENSED
Start: 2021-12-02